# Patient Record
Sex: FEMALE | Race: WHITE | NOT HISPANIC OR LATINO | Employment: FULL TIME | URBAN - NONMETROPOLITAN AREA
[De-identification: names, ages, dates, MRNs, and addresses within clinical notes are randomized per-mention and may not be internally consistent; named-entity substitution may affect disease eponyms.]

---

## 2017-09-19 ENCOUNTER — OFFICE VISIT (OUTPATIENT)
Dept: URGENT CARE | Facility: PHYSICIAN GROUP | Age: 32
End: 2017-09-19
Payer: MEDICAID

## 2017-09-19 VITALS
SYSTOLIC BLOOD PRESSURE: 110 MMHG | DIASTOLIC BLOOD PRESSURE: 80 MMHG | RESPIRATION RATE: 16 BRPM | TEMPERATURE: 98.6 F | HEART RATE: 108 BPM | BODY MASS INDEX: 33.37 KG/M2 | OXYGEN SATURATION: 98 % | HEIGHT: 68 IN | WEIGHT: 220.2 LBS

## 2017-09-19 DIAGNOSIS — J31.0 RHINITIS, UNSPECIFIED TYPE: ICD-10-CM

## 2017-09-19 DIAGNOSIS — H65.93 FLUID LEVEL BEHIND TYMPANIC MEMBRANE OF BOTH EARS: ICD-10-CM

## 2017-09-19 PROCEDURE — 99214 OFFICE O/P EST MOD 30 MIN: CPT | Performed by: PHYSICIAN ASSISTANT

## 2017-09-19 RX ORDER — DEXAMETHASONE 4 MG/1
10 TABLET ORAL ONCE
Qty: 3 TAB | Refills: 0 | Status: SHIPPED | OUTPATIENT
Start: 2017-09-19 | End: 2017-09-19

## 2017-09-19 NOTE — PROGRESS NOTES
Chief Complaint   Patient presents with   • Sinus Problem       HISTORY OF PRESENT ILLNESS: Patient is a 32 y.o. female who presents today for the following:    Patient comes in for evaluation of nasal congestion and nasal burning that started last night. She complains of a small amount of clear nasal drainage. She denies ear pain, sore throat, cough, fever, night sweats, body aches. She has not taken any over-the-counter medication, stating that she has bad reactions to most over-the-counter medication. She denies any difficulty breathing. She is unable to use any nose sprays or nasal rinses.    There are no active problems to display for this patient.      Allergies:Amoxicillin    Current Outpatient Prescriptions Ordered in [facility]   Medication Sig Dispense Refill   • BuPROPion HCl (WELLBUTRIN PO) Take  by mouth.     • dexamethasone (DECADRON) 4 MG Tab Take 2.5 Tabs by mouth Once for 1 dose. 3 Tab 0   • NITROFURANTOIN MACROCRYSTAL PO Take  by mouth.     • Buprenorphine HCl 150 MCG FILM Place  in mouth by cheek.     • amoxicillin-clavulanate (AUGMENTIN) 875-125 MG Tab Take 1 Tab by mouth 2 times a day. 20 Tab 0     No current Epic-ordered facility-administered medications on file.        No past medical history on file.    Social History   Substance Use Topics   • Smoking status: Current Every Day Smoker     Packs/day: 0.50     Types: Cigarettes   • Smokeless tobacco: Never Used   • Alcohol use No       No family status information on file.   No family history on file.    ROS:   Review of Systems   Constitutional: Negative for fever, chills, weight loss and malaise/fatigue.   HENT: Negative for ear pain, nosebleeds, sore throat and neck pain.    Eyes: Negative for blurred vision.   Respiratory: Negative for cough, sputum production, shortness of breath and wheezing.    Cardiovascular: Negative for chest pain, palpitations, orthopnea and leg swelling.   Gastrointestinal: Negative for heartburn, nausea, vomiting and  "abdominal pain.   Genitourinary: Negative for dysuria, urgency and frequency.       Exam:  Blood pressure 110/80, pulse (!) 108, temperature 37 °C (98.6 °F), resp. rate 16, height 1.727 m (5' 8\"), weight 99.9 kg (220 lb 3.2 oz), SpO2 98 %.  General: Well developed, well nourished. No distress.  HEENT: Conjunctiva clear, lids without ptosis, PERRL/EOMI. Ears normal shape and contour, canals are clear bilaterally, tympanic membranes are benign But with clear fluid posteriorly bilaterally. Nasal mucosa Edematous bilaterally. Oropharynx is without erythema, edema or exudates. Reasonable dentition.  Pulmonary: Clear to ausculation and percussion.  Normal effort. No rales, ronchi, or wheezing.   Cardiovascular: Regular rate and rhythm without murmur. No edema.   Neurologic: Grossly nonfocal.  Lymph: No cervical lymphadenopathy noted.  Skin: Warm, dry, good turgor. No rashes in visible areas.   Psych: Normal mood. Alert and oriented x3. Judgment and insight is normal.    Assessment/Plan:  Discussed likely due to seasonal allergies or early viral illness. Discussed appropriate over-the-counter symptomatic medication, and when to return to clinic. Follow up for worsening or persistent symptoms.  1. Rhinitis, unspecified type  dexamethasone (DECADRON) 4 MG Tab   2. Fluid level behind tympanic membrane of both ears         "

## 2017-12-26 ENCOUNTER — OFFICE VISIT (OUTPATIENT)
Dept: URGENT CARE | Facility: PHYSICIAN GROUP | Age: 32
End: 2017-12-26
Payer: MEDICAID

## 2017-12-26 VITALS
SYSTOLIC BLOOD PRESSURE: 110 MMHG | HEART RATE: 69 BPM | WEIGHT: 213.3 LBS | TEMPERATURE: 98.2 F | OXYGEN SATURATION: 97 % | HEIGHT: 68 IN | RESPIRATION RATE: 16 BRPM | DIASTOLIC BLOOD PRESSURE: 80 MMHG | BODY MASS INDEX: 32.33 KG/M2

## 2017-12-26 DIAGNOSIS — H10.9 CONJUNCTIVITIS OF BOTH EYES, UNSPECIFIED CONJUNCTIVITIS TYPE: ICD-10-CM

## 2017-12-26 PROCEDURE — 99213 OFFICE O/P EST LOW 20 MIN: CPT | Performed by: FAMILY MEDICINE

## 2017-12-26 RX ORDER — POLYMYXIN B SULFATE AND TRIMETHOPRIM 1; 10000 MG/ML; [USP'U]/ML
1 SOLUTION OPHTHALMIC EVERY 4 HOURS
Qty: 1 BOTTLE | Refills: 0 | Status: SHIPPED | OUTPATIENT
Start: 2017-12-26 | End: 2018-03-07

## 2017-12-26 ASSESSMENT — ENCOUNTER SYMPTOMS
MYALGIAS: 0
SINUS PAIN: 1
HEADACHES: 1
PHOTOPHOBIA: 0
DOUBLE VISION: 0
BLURRED VISION: 0
WEIGHT LOSS: 0

## 2017-12-26 NOTE — PROGRESS NOTES
"Subjective:      Hoda Trevino is a 32 y.o. female who presents with Conjunctivitis; Runny Nose; and Pharyngitis            4 days left eye red and draining. 1 day right eye red and draining. Intermittent subjective fever. +nasal congestion. +ST. Hoarse voice. Intermittent cough. OTC dayquil with minimal improvement. No other aggravating or alleviating factors.        No contact lens use    Review of Systems   Constitutional: Negative for malaise/fatigue and weight loss.   HENT: Positive for sinus pain.    Eyes: Negative for blurred vision, double vision and photophobia.   Musculoskeletal: Negative for myalgias.   Skin: Negative for itching and rash.   Neurological: Positive for headaches.     .  Medications, Allergies, and current problem list reviewed today in Epic       Objective:     /80   Pulse 69   Temp 36.8 °C (98.2 °F)   Resp 16   Ht 1.727 m (5' 8\")   Wt 96.8 kg (213 lb 4.8 oz)   SpO2 97%   BMI 32.43 kg/m²      Physical Exam   Constitutional: She appears well-developed and well-nourished. No distress.   HENT:   Head: Normocephalic and atraumatic.   Nasal congestion  Pharynx red without exudate     Eyes: EOM are normal. Pupils are equal, round, and reactive to light.   B conjunctiva injected with moderate exudate. No foreign body. No gross corneal lesion.     Neck: Neck supple.   Cardiovascular: Normal rate, regular rhythm and normal heart sounds.    Pulmonary/Chest: Effort normal and breath sounds normal.   Lymphadenopathy:     She has no cervical adenopathy.   Neurological:   Speech is clear. Patient is appropriate and cooperative.     Skin: Skin is warm and dry. No rash noted.               Assessment/Plan:     1. Conjunctivitis of both eyes, unspecified conjunctivitis type  polymixin-trimethoprim (POLYTRIM) 25269-6.1 UNIT/ML-% Solution     Differential diagnosis, natural history, supportive care, and indications for immediate follow-up discussed at length.     "

## 2018-03-07 ENCOUNTER — OFFICE VISIT (OUTPATIENT)
Dept: MEDICAL GROUP | Facility: CLINIC | Age: 33
End: 2018-03-07
Payer: MEDICAID

## 2018-03-07 VITALS
SYSTOLIC BLOOD PRESSURE: 122 MMHG | WEIGHT: 217.5 LBS | DIASTOLIC BLOOD PRESSURE: 80 MMHG | RESPIRATION RATE: 16 BRPM | HEIGHT: 69 IN | OXYGEN SATURATION: 99 % | TEMPERATURE: 98.3 F | HEART RATE: 78 BPM | BODY MASS INDEX: 32.22 KG/M2

## 2018-03-07 DIAGNOSIS — Z00.00 ENCOUNTER FOR MEDICAL EXAMINATION TO ESTABLISH CARE: ICD-10-CM

## 2018-03-07 DIAGNOSIS — F31.81 BIPOLAR 2 DISORDER (HCC): ICD-10-CM

## 2018-03-07 DIAGNOSIS — Z87.42 HISTORY OF ABNORMAL CERVICAL PAPANICOLAOU SMEAR: ICD-10-CM

## 2018-03-07 DIAGNOSIS — E66.9 OBESITY (BMI 30-39.9): ICD-10-CM

## 2018-03-07 DIAGNOSIS — Z23 NEED FOR VACCINATION: ICD-10-CM

## 2018-03-07 PROCEDURE — 90715 TDAP VACCINE 7 YRS/> IM: CPT | Performed by: PHYSICIAN ASSISTANT

## 2018-03-07 PROCEDURE — 99214 OFFICE O/P EST MOD 30 MIN: CPT | Mod: 25 | Performed by: PHYSICIAN ASSISTANT

## 2018-03-07 PROCEDURE — 90471 IMMUNIZATION ADMIN: CPT | Performed by: PHYSICIAN ASSISTANT

## 2018-03-07 RX ORDER — BUPROPION HYDROCHLORIDE 150 MG/1
300 TABLET ORAL EVERY MORNING
COMMUNITY
End: 2018-04-26

## 2018-03-07 ASSESSMENT — PATIENT HEALTH QUESTIONNAIRE - PHQ9
5. POOR APPETITE OR OVEREATING: 0 - NOT AT ALL
CLINICAL INTERPRETATION OF PHQ2 SCORE: 1
SUM OF ALL RESPONSES TO PHQ QUESTIONS 1-9: 4

## 2018-03-07 NOTE — ASSESSMENT & PLAN NOTE
This patient continues to gain weight and is not specifically watching her calorie intake at this time she does not exercise.

## 2018-03-07 NOTE — ASSESSMENT & PLAN NOTE
"Patient has taken medication for this in the past. It is a chronic problem. The patient states that when \"switches to the dark side\" she becomes very sad, angry and withdrawn. During these times, she does not wish to interact with her infant son or her romantic partner. She states she becomes inconsolable during these times although she states that marijuana tends to calm her down, if she is able to obtain this. She states that during her \"light side\" episodes she is very productive, sewing and taking excellent care of herself and her children and partner. She states she barely becomes manic to the degree that she is out of control.  "

## 2018-03-07 NOTE — PROGRESS NOTES
"Chief Complaint   Patient presents with   • Manic Behavior       HISTORY OF PRESENT ILLNESS: Patient is a 32 y.o. female established patient who presents today for evaluation and management of:    Obesity (BMI 30-39.9)  This patient continues to gain weight and is not specifically watching her calorie intake at this time she does not exercise.     History of abnormal cervical Papanicolaou smear  Patient states she has had a LEEP procedure and positive HPV in the past. She states she leaves she is due for repeat Pap smear as it has been about one year at this point in time.    Bipolar 2 disorder (CMS-HCC)  Patient has taken medication for this in the past. It is a chronic problem. The patient states that when \"switches to the dark side\" she becomes very sad, angry and withdrawn. During these times, she does not wish to interact with her infant son or her romantic partner. She states she becomes inconsolable during these times although she states that marijuana tends to calm her down, if she is able to obtain this. She states that during her \"light side\" episodes she is very productive, sewing and taking excellent care of herself and her children and partner. She states she barely becomes manic to the degree that she is out of control.    Encounter for medical examination to establish care  Patient does not currently have a primary care provider but is seeking to establish care with one today.       Patient Active Problem List    Diagnosis Date Noted   • Obesity (BMI 30-39.9) 03/07/2018   • Bipolar 2 disorder (CMS-HCC) 03/07/2018   • History of abnormal cervical Papanicolaou smear 03/07/2018   • Encounter for medical examination to establish care 03/07/2018       Allergies:Amoxicillin    Current Outpatient Prescriptions   Medication Sig Dispense Refill   • buPROPion (WELLBUTRIN XL) 150 MG XL tablet Take 300 mg by mouth every morning.     • sertraline (ZOLOFT) 50 MG Tab 0.5 tab by mouth once daily for 7 days then, 1.0 " "tablet by mouth once daily thereafter 30 Tab 2     No current facility-administered medications for this visit.        Social History   Substance Use Topics   • Smoking status: Current Every Day Smoker     Packs/day: 0.75     Years: 20.00     Types: Cigarettes   • Smokeless tobacco: Never Used      Comment: 0.75ppd    • Alcohol use No       Family Status   Relation Status   • Mother Alive   • Father    • Sister Alive   • Brother Alive   • Child Alive     Family History   Problem Relation Age of Onset   • Kidney Disease Mother    • Psychiatry Mother    • Heart Disease Father    • Kidney Disease Father    • Diabetes Father    • Heart Disease Sister    • Depression Sister    • GI Sister    • No Known Problems Child        Review of Systems:   Constitutional: Negative for fever, chills, weight loss and malaise/fatigue.   HENT: Negative for ear pain, nosebleeds, congestion, sore throat and neck pain.    Eyes: Negative for blurred vision.   Respiratory: Positive for daily smoker's cough. Negative for sputum production, shortness of breath and wheezing.    Cardiovascular: Negative for chest pain, palpitations, orthopnea and leg swelling.   Gastrointestinal: Negative for heartburn, nausea, vomiting and abdominal pain.   Genitourinary: Negative for dysuria, urgency and frequency.   Neurological: Negative for dizziness, tingling, tremors, sensory change, focal weakness and headaches.   Endo/Heme/Allergies: Does not bruise/bleed easily.   Psychiatric/Behavioral: See history of present illness above. Positive for depression. Negative for suicidal ideas and memory loss.  The patient is occasionally nervous/anxious and does not have insomnia.      Exam:  Blood pressure 122/80, pulse 78, temperature 36.8 °C (98.3 °F), resp. rate 16, height 1.753 m (5' 9\"), weight 98.7 kg (217 lb 8 oz), SpO2 99 %.  Body mass index is 32.12 kg/m².  General:  Obese female in NAD  Head: is grossly normal. Poor dentition with multiple dental " caries.   Neck: Supple without masses. Thyroid is not visibly enlarged.  Pulmonary: Clear to ausculation. Normal effort. No rales, ronchi, or wheezing.  Cardiovascular: Regular rate and rhythm without murmur. Carotid and radial pulses are intact and equal bilaterally.  Extremities: no clubbing, cyanosis, or edema.  Behavioral: Patient seems to have normal insight and judgment at this time.    Medical decision-making and discussion:  1. Need for vaccination  - Tdap =>8yo IM    2. Obesity (BMI 30-39.9)  - Patient identified as having weight management issue.  Appropriate orders and counseling given.    3. Bipolar 2 disorder (CMS-[facility])    - sertraline (ZOLOFT) 50 MG Tab; 0.5 tab by mouth once daily for 7 days then, 1.0 tablet by mouth once daily thereafter  Dispense: 30 Tab; Refill: 2  - CBC WITH DIFFERENTIAL    4. History of abnormal cervical Papanicolaou smear  PAP scheduled.     5. Encounter for medical examination to establish care  I am happy to follow the care of this pleasant 32 year old woman.       Please note that this dictation was created using voice recognition software. I have made every reasonable attempt to correct obvious errors, but I expect that there are errors of grammar and possibly content that I did not discover before finalizing the note.      Return in about 4 weeks (around 4/4/2018) for bipolar follow up and seperate visit for Female annual.

## 2018-03-07 NOTE — ASSESSMENT & PLAN NOTE
Patient states she has had a LEEP procedure and positive HPV in the past. She states she leaves she is due for repeat Pap smear as it has been about one year at this point in time.

## 2018-03-07 NOTE — ASSESSMENT & PLAN NOTE
Patient does not currently have a primary care provider but is seeking to establish care with one today.

## 2018-03-22 ENCOUNTER — OFFICE VISIT (OUTPATIENT)
Dept: MEDICAL GROUP | Facility: CLINIC | Age: 33
End: 2018-03-22
Payer: MEDICAID

## 2018-03-22 ENCOUNTER — HOSPITAL ENCOUNTER (OUTPATIENT)
Facility: MEDICAL CENTER | Age: 33
End: 2018-03-22
Attending: PHYSICIAN ASSISTANT
Payer: MEDICAID

## 2018-03-22 VITALS
SYSTOLIC BLOOD PRESSURE: 122 MMHG | HEART RATE: 74 BPM | HEIGHT: 69 IN | OXYGEN SATURATION: 95 % | BODY MASS INDEX: 31.7 KG/M2 | WEIGHT: 214 LBS | TEMPERATURE: 98 F | RESPIRATION RATE: 16 BRPM | DIASTOLIC BLOOD PRESSURE: 60 MMHG

## 2018-03-22 DIAGNOSIS — Z01.419 WELL FEMALE EXAM WITH ROUTINE GYNECOLOGICAL EXAM: ICD-10-CM

## 2018-03-22 DIAGNOSIS — Z12.4 SCREENING FOR MALIGNANT NEOPLASM OF CERVIX: ICD-10-CM

## 2018-03-22 PROCEDURE — 88175 CYTOPATH C/V AUTO FLUID REDO: CPT

## 2018-03-22 PROCEDURE — 99395 PREV VISIT EST AGE 18-39: CPT | Mod: EP | Performed by: PHYSICIAN ASSISTANT

## 2018-03-22 PROCEDURE — 87624 HPV HI-RISK TYP POOLED RSLT: CPT

## 2018-03-22 PROCEDURE — 99000 SPECIMEN HANDLING OFFICE-LAB: CPT | Performed by: PHYSICIAN ASSISTANT

## 2018-03-22 NOTE — PROGRESS NOTES
SUBJECTIVE: 33 y.o. female for routine pap and checkup.  Chief Complaint   Patient presents with   • Gynecologic Exam         Last Pap: 2017  Contraception: Mirena IUD  H/O Abnormal Pap yes had LEEP procedure  H/O STI no  Current partner: male, monogamous yes    LMP: Patient's last menstrual period was 2017.    Allergies: Amoxicillin     ROS:  Feeling of menses every 3 months without cramping and with rare spotting and with labile mood.   No menstrual depression, anxiety, bloating/fluid retention, insomnia, migraine headaches, libido changes   no menopause symptoms of hot flashes, night sweats, sleep disruption, mood changes, vaginal dryness.   No pelvic pain, or dyspareunia. No vaginal discharge   No breast tenderness, mass, nipple discharge, changes in size or contour, or abnormal cyclic discomfort.  No urinary tract symptoms, no incontinence.   No abdominal pain, change in bowel habits, black or bloody stools.    No visual changes. Headaches 5-10 per month depending on stress levels and weather.   No prolonged cough. No dyspnea or chest pain on exertion.    No skin lesions, concerns.     Preventive Care:  Mammogram none    DEXA not due  Colonoscopy not due   HPV vaccine not eligible and never received.     Current Outpatient Prescriptions   Medication Sig Dispense Refill   • buPROPion (WELLBUTRIN XL) 150 MG XL tablet Take 300 mg by mouth every morning.     • sertraline (ZOLOFT) 50 MG Tab 0.5 tab by mouth once daily for 7 days then, 1.0 tablet by mouth once daily thereafter 30 Tab 2     No current facility-administered medications for this visit.      She  has no past medical history of Asthma or Diabetes (CMS-[facility]).  She  has no past surgical history on file.     Family History:   Family History   Problem Relation Age of Onset   • Kidney Disease Mother    • Psychiatry Mother    • Heart Disease Father    • Kidney Disease Father    • Diabetes Father    • Heart Disease Sister    • Depression  "Sister    • GI Sister    • No Known Problems Child    • Dementia Maternal Grandmother    • Breast Cancer Paternal Grandmother    • Lung Cancer Paternal Grandfather      possible asbestos       Family History negative for : Colon, Lung, or female organ cancer, thyroid disease, osteoporosis.     OBJECTIVE:   /60   Pulse 74   Temp 36.7 °C (98 °F)   Resp 16   Ht 1.753 m (5' 9\")   Wt 97.1 kg (214 lb)   LMP 02/22/2017   SpO2 95%   Breastfeeding? No   BMI 31.60 kg/m²   Body mass index is 31.6 kg/m².      HEAD AND NECK:  Ears normal.  Throat, oral cavity and tongue normal.  Neck supple. No adenopathy or masses in the neck or supraclavicular regions.  No carotid bruits. No thyromegaly.   NEURO: Cranial nerves are normal. DTR's normal and symmetric.    CHEST:  Clear, good air entry, no wheezes or rales.   HEART:  Regular rate and rhythm.  S1 and S2 normal. No edema or JVD.   ABDOMEN:  Soft without tenderness, guarding, mass or organomegaly.  No CVA tenderness or inguinal adenopathy.   EXTREMITIES:  Extremities, reflexes and peripheral pulses are normal.    SKIN:  No rashes or suspicious skin lesions noted.     Breast Exam: Performed with instruction during examination. No axillary lymphadenopathy. No fixed or dominant masses. No nipple retraction or skin abnormalities.    PELVIC EXAMINATION    Chaperone Marta Hooks MA    Labia: normal, no lesions or erythema noted   Urethral Orifice: normal  Vagina: vaginal canal clear, no lesions  Cervix: IUD strings noted at about 1.5cm long No polyps, masses or lesions noted. Thin yellow discharge noted.     BIMANUAL EXAM   Vaginal Walls: normal  Cervix: No CMT  Uterus: normal to palpation without masses or tenderness  Adnexa: wnl to palpation without masses or tenderness.   Kegel muscles weak to squeeze on bimanual exam      <ASSESSMENT>  1. Well female exam with routine gynecological exam  THINPREP PAP WITH HPV   2. Screening for malignant neoplasm of cervix  THINPREP " PAP WITH HPV       Discussed breast self exam, mammography screening, STD prevention, use and side effects of HRT, family planning choices and adequate intake of calcium and vitamin D   Follow-up in 3 years for next Gyn exam and Pap if all tests collected today are normal.

## 2018-03-23 DIAGNOSIS — Z01.419 WELL FEMALE EXAM WITH ROUTINE GYNECOLOGICAL EXAM: ICD-10-CM

## 2018-03-23 DIAGNOSIS — Z12.4 SCREENING FOR MALIGNANT NEOPLASM OF CERVIX: ICD-10-CM

## 2018-03-26 LAB
CYTOLOGY REG CYTOL: NORMAL
HPV HR 12 DNA CVX QL NAA+PROBE: NEGATIVE
HPV16 DNA SPEC QL NAA+PROBE: NEGATIVE
HPV18 DNA SPEC QL NAA+PROBE: NEGATIVE
SPECIMEN SOURCE: NORMAL

## 2018-04-26 ENCOUNTER — OFFICE VISIT (OUTPATIENT)
Dept: MEDICAL GROUP | Facility: CLINIC | Age: 33
End: 2018-04-26
Payer: MEDICAID

## 2018-04-26 VITALS
SYSTOLIC BLOOD PRESSURE: 128 MMHG | RESPIRATION RATE: 16 BRPM | OXYGEN SATURATION: 96 % | BODY MASS INDEX: 31.25 KG/M2 | HEIGHT: 69 IN | TEMPERATURE: 99.2 F | HEART RATE: 80 BPM | DIASTOLIC BLOOD PRESSURE: 76 MMHG | WEIGHT: 211 LBS

## 2018-04-26 DIAGNOSIS — F17.200 READY TO QUIT SMOKING: ICD-10-CM

## 2018-04-26 DIAGNOSIS — S29.012A MUSCLE STRAIN OF RIGHT UPPER BACK, INITIAL ENCOUNTER: ICD-10-CM

## 2018-04-26 DIAGNOSIS — F31.81 BIPOLAR 2 DISORDER (HCC): ICD-10-CM

## 2018-04-26 PROBLEM — Z00.00 ENCOUNTER FOR MEDICAL EXAMINATION TO ESTABLISH CARE: Status: RESOLVED | Noted: 2018-03-07 | Resolved: 2018-04-26

## 2018-04-26 PROCEDURE — 99406 BEHAV CHNG SMOKING 3-10 MIN: CPT | Performed by: PHYSICIAN ASSISTANT

## 2018-04-26 PROCEDURE — 99213 OFFICE O/P EST LOW 20 MIN: CPT | Mod: 25 | Performed by: PHYSICIAN ASSISTANT

## 2018-04-26 RX ORDER — BUPROPION HYDROCHLORIDE 100 MG/1
100 TABLET ORAL 2 TIMES DAILY
Qty: 60 TAB | Refills: 5 | Status: SHIPPED | OUTPATIENT
Start: 2018-04-26 | End: 2019-11-15

## 2018-04-26 ASSESSMENT — PAIN SCALES - GENERAL: PAINLEVEL: 3=SLIGHT PAIN

## 2018-04-26 NOTE — ASSESSMENT & PLAN NOTE
This patient states that about one month ago, she experienced right shoulder and upper back pain that has not resolved. She states that occasionally she also experiences right-sided neck pain associated with her right shoulder and upper back pain. She has taken ibuprofen however cannot recall if this alleviated her pain as she typically uses marijuana for pain relief. She states she is unable to sleep on her right side due to pain and her pain is made worse with roughhousing with her young son.

## 2018-04-26 NOTE — PROGRESS NOTES
"Chief Complaint   Patient presents with   • Shoulder Pain     Poss. injury playing with child        HISTORY OF PRESENT ILLNESS: Patient is a 33 y.o. female established patient who presents today for evaluation and management of:    Ready to quit smoking  This patient has been smoking for many years. She continues to smoke 3/4-1 pack per day but truly wishes to quit. She is requesting Wellbutrin at this time as she has previously successfully quit smoking while using Wellbutrin.    Muscle strain of right upper back  This patient states that about one month ago, she experienced right shoulder and upper back pain that has not resolved. She states that occasionally she also experiences right-sided neck pain associated with her right shoulder and upper back pain. She has taken ibuprofen however cannot recall if this alleviated her pain as she typically uses marijuana for pain relief. She states she is unable to sleep on her right side due to pain and her pain is made worse with roughhousing with her young son.    Bipolar 2 disorder (CMS-[facility])  Patient has taken medication for this in the past. It is a chronic problem. The patient states that when \"switches to the dark side\" she becomes very sad, angry and withdrawn. During these times, she does not wish to interact with her infant son or her romantic partner. She states she becomes inconsolable during these times although she states that marijuana tends to calm her down, if she is able to obtain this. She states that during her \"light side\" episodes she is very productive, sewing and taking excellent care of herself and her children and partner. She states she barely becomes manic to the degree that she is out of control. She has tried taking Zoloft for a couple of months however, she states she felt too sleepy on this medication and has recently been controlling her symptoms without medication management. She wishes to remain off medication for this at this time and " states she will return for follow-up as needed for medication management.       Patient Active Problem List    Diagnosis Date Noted   • Muscle strain of right upper back 2018   • Ready to quit smoking 2018   • Obesity (BMI 30-39.9) 2018   • Bipolar 2 disorder (CMS-[facility]) 2018   • History of abnormal cervical Papanicolaou smear 2018       Allergies:Amoxicillin    Current Outpatient Prescriptions   Medication Sig Dispense Refill   • Diclofenac Sodium 1 % Gel Apply 1 g to skin as directed 3 times a day as needed. 15 g 2   • buPROPion (WELLBUTRIN) 100 MG Tab Take 1 Tab by mouth 2 times a day. 60 Tab 5     No current facility-administered medications for this visit.        Social History   Substance Use Topics   • Smoking status: Current Every Day Smoker     Packs/day: 0.75     Years: 20.00     Types: Cigarettes   • Smokeless tobacco: Never Used      Comment: 0.75ppd    • Alcohol use No       Family Status   Relation Status   • Mother Alive   • Father    • Sister Alive   • Brother Alive   • Child Alive   • Maternal Grandmother    • Maternal Grandfather    • Paternal Grandmother    • Paternal Grandfather      Family History   Problem Relation Age of Onset   • Kidney Disease Mother    • Psychiatry Mother    • Heart Disease Father    • Kidney Disease Father    • Diabetes Father    • Heart Disease Sister    • Depression Sister    • GI Sister    • No Known Problems Child    • Dementia Maternal Grandmother    • Breast Cancer Paternal Grandmother    • Lung Cancer Paternal Grandfather      possible asbestos       Review of Systems:   Constitutional: Negative for fever, chills, weight loss and malaise/fatigue.   HENT: Negative for ear pain, nosebleeds, congestion, sore throat  Eyes: Negative for blurred vision.   Respiratory: Negative for cough and shortness of breath at rest  Cardiovascular: Negative for chest pain  Musculoskeletal: See history of present  "illness above. Negative for myalgias.  Skin: Negative for rash and itching.   Neurological: Negative for dizziness and headaches. Negative for loss of sensation in the right arm.  Endo/Heme/Allergies: Does not bruise/bleed easily.   Psychiatric/Behavioral: See history of present illness above. Negative for suicidal ideas and memory loss.  The patient is not nervous/anxious and does not have insomnia.      Exam:  Blood pressure 128/76, pulse 80, temperature 37.3 °C (99.2 °F), resp. rate 16, height 1.753 m (5' 9\"), weight 95.7 kg (211 lb), last menstrual period 04/03/2017, SpO2 96 %, not currently breastfeeding.  Body mass index is 31.16 kg/m².  General:  Overweight female in NAD  Head: is grossly normal.  Neck: Supple without masses. Thyroid is not visibly enlarged.  Pulmonary: Normal effort.  Cardiovascular:Carotid and radial pulses are intact and equal bilaterally.  Extremities: no clubbing, cyanosis, or edema.  Musculoskeletal: Speed sign positive, empty can test positive positive with pain to abduction of right shoulder. Negative for pain to all passive range of motion of right arm and shoulder. Pain to palpation near the deltoid tuberosity of the right humerus. No erythema or edema noted.    Medical decision-making and discussion:  1. Muscle strain of right upper back, initial encounter  - REFERRAL TO PHYSICAL THERAPY Reason for Therapy: Eval/Treat/Report  - Diclofenac Sodium 1 % Gel; Apply 1 g to skin as directed 3 times a day as needed.  Dispense: 15 g; Refill: 2    2. Ready to quit smoking  - buPROPion (WELLBUTRIN) 100 MG Tab; Take 1 Tab by mouth 2 times a day.  Dispense: 60 Tab; Refill: 5    3. Bipolar 2 disorder (CMS-[facility])  Patient was strongly advised to seek care if she develops symptoms of bipolar disorder again in the future.      Please note that this dictation was created using voice recognition software. I have made every reasonable attempt to correct obvious errors, but I expect that there are errors " of grammar and possibly content that I did not discover before finalizing the note.      Return in about 4 weeks (around 5/24/2018) for smoking cessation.

## 2018-04-26 NOTE — ASSESSMENT & PLAN NOTE
"Patient has taken medication for this in the past. It is a chronic problem. The patient states that when \"switches to the dark side\" she becomes very sad, angry and withdrawn. During these times, she does not wish to interact with her infant son or her romantic partner. She states she becomes inconsolable during these times although she states that marijuana tends to calm her down, if she is able to obtain this. She states that during her \"light side\" episodes she is very productive, sewing and taking excellent care of herself and her children and partner. She states she barely becomes manic to the degree that she is out of control. She has tried taking Zoloft for a couple of months however, she states she felt too sleepy on this medication and has recently been controlling her symptoms without medication management. She wishes to remain off medication for this at this time and states she will return for follow-up as needed for medication management.  "

## 2018-04-26 NOTE — ASSESSMENT & PLAN NOTE
This patient has been smoking for many years. She continues to smoke 3/4-1 pack per day but truly wishes to quit. She is requesting Wellbutrin at this time as she has previously successfully quit smoking while using Wellbutrin.

## 2018-05-30 ENCOUNTER — OFFICE VISIT (OUTPATIENT)
Dept: MEDICAL GROUP | Facility: CLINIC | Age: 33
End: 2018-05-30
Payer: MEDICAID

## 2018-05-30 VITALS
OXYGEN SATURATION: 97 % | RESPIRATION RATE: 14 BRPM | TEMPERATURE: 98 F | HEIGHT: 69 IN | DIASTOLIC BLOOD PRESSURE: 72 MMHG | SYSTOLIC BLOOD PRESSURE: 126 MMHG | WEIGHT: 213 LBS | HEART RATE: 82 BPM | BODY MASS INDEX: 31.55 KG/M2

## 2018-05-30 DIAGNOSIS — F31.81 BIPOLAR 2 DISORDER (HCC): ICD-10-CM

## 2018-05-30 DIAGNOSIS — S29.012D MUSCLE STRAIN OF RIGHT UPPER BACK, SUBSEQUENT ENCOUNTER: ICD-10-CM

## 2018-05-30 DIAGNOSIS — F17.200 READY TO QUIT SMOKING: ICD-10-CM

## 2018-05-30 PROCEDURE — 99213 OFFICE O/P EST LOW 20 MIN: CPT | Performed by: PHYSICIAN ASSISTANT

## 2018-05-30 NOTE — ASSESSMENT & PLAN NOTE
This patient states that about 2 months ago, she experienced right shoulder and upper back pain that has now somewhat resolved with physical therapy. She states that occasionally she also experiences right-sided neck pain associated with her right shoulder and upper back pain. She has taken ibuprofen with moderate pain relief and she typically uses marijuana for pain relief. She states she is unable to sleep on her right side due to pain and her pain is made worse with roughhousing with her young son. She did not try using the diclofenac gel that had been prescribed because her physical therapist advised against this. She feels she does not need any further medical management of this problem at this time.

## 2018-05-30 NOTE — PROGRESS NOTES
Chief Complaint   Patient presents with   • Shoulder Pain     x 2 months     HISTORY OF PRESENT ILLNESS: Patient is a 33 y.o. female established patient who presents today for evaluation and management of:    Bipolar 2 disorder (CMS-HCC) (HCC)  This patient states that smoking marijuana when she has triggers that result in angry outbursts really helps prevent her from becoming aggressive. She states she is feeling able to go out with her  more and be more social recently. She is requesting a behavioral health referral in order to meet with a counselor. She tried taking Zoloft for a couple of months with adverse side effects and so stopped this medication.    Muscle strain of right upper back  This patient states that about 2 months ago, she experienced right shoulder and upper back pain that has now somewhat resolved with physical therapy. She states that occasionally she also experiences right-sided neck pain associated with her right shoulder and upper back pain. She has taken ibuprofen with moderate pain relief and she typically uses marijuana for pain relief. She states she is unable to sleep on her right side due to pain and her pain is made worse with roughhousing with her young son. She did not try using the diclofenac gel that had been prescribed because her physical therapist advised against this. She feels she does not need any further medical management of this problem at this time.    Ready to quit smoking  This patient has been smoking for many years she recently quit smoking for approximately 6 days and then restarted and is currently smoking 5-6 cigarettes per day. She had been using Wellbutrin and has restarted her course in cutting down with the goal of quitting smoking.       Patient Active Problem List    Diagnosis Date Noted   • Muscle strain of right upper back 04/26/2018   • Ready to quit smoking 04/26/2018   • Obesity (BMI 30-39.9) 03/07/2018   • Bipolar 2 disorder (HCC) 03/07/2018   •  History of abnormal cervical Papanicolaou smear 2018       Allergies:Amoxicillin    Current Outpatient Prescriptions   Medication Sig Dispense Refill   • Diclofenac Sodium 1 % Gel Apply 1 g to skin as directed 3 times a day as needed. 15 g 2   • buPROPion (WELLBUTRIN) 100 MG Tab Take 1 Tab by mouth 2 times a day. 60 Tab 5     No current facility-administered medications for this visit.        Social History   Substance Use Topics   • Smoking status: Current Every Day Smoker     Packs/day: 0.75     Years: 20.00     Types: Cigarettes   • Smokeless tobacco: Never Used   • Alcohol use No       Family Status   Relation Status   • Mother Alive   • Father    • Sister Alive   • Brother Alive   • Child Alive   • Maternal Grandmother    • Maternal Grandfather    • Paternal Grandmother    • Paternal Grandfather      Family History   Problem Relation Age of Onset   • Kidney Disease Mother    • Psychiatry Mother    • Heart Disease Father    • Kidney Disease Father    • Diabetes Father    • Heart Disease Sister    • Depression Sister    • GI Sister    • No Known Problems Child    • Dementia Maternal Grandmother    • Breast Cancer Paternal Grandmother    • Lung Cancer Paternal Grandfather      possible asbestos       Review of Systems:   Constitutional: Negative for fever, chills, weight loss and malaise/fatigue.   HENT: Negative for ear pain, nosebleeds, congestion, sore throat  Respiratory: Negative for cough and shortness of breath  Cardiovascular: Negative for chest pain  Gastrointestinal: Negative for heartburn, nausea, vomiting and abdominal pain.   Musculoskeletal: Negative for myalgias, back pain. See history of present illness above.   Skin: Negative for rash and itching.   Endo/Heme/Allergies: Does not bruise/bleed easily.   Psychiatric/Behavioral: Positive for mood disorder/depression. Negative for suicidal ideas and memory loss.  The patient is not nervous/anxious and does  "not have insomnia.      Exam:  Blood pressure 126/72, pulse 82, temperature 36.7 °C (98 °F), resp. rate 14, height 1.753 m (5' 9\"), weight 96.6 kg (213 lb), SpO2 97 %.  Body mass index is 31.45 kg/m².  General:  Obese female in NAD  Head: is grossly normal.  Neck: Supple without masses. Thyroid is not visibly enlarged.  Pulmonary: Clear to ausculation. Normal effort. No rales, ronchi, or wheezing.  Cardiovascular: Regular rate and rhythm without murmur. Carotid and radial pulses are intact and equal bilaterally.  Extremities: no clubbing, cyanosis, or edema.    Medical decision-making and discussion:  1. Muscle strain of right upper back, subsequent encounter  Patient would like to continue with physical therapy at this time as she does not wish to take any medications that are prescribed to alleviate her pain.    2. Bipolar 2 disorder (HCC)  - REFERRAL TO BEHAVIORAL HEALTH    3. Ready to quit smoking  Patient was advised to continue trying to quit smoking as this will improve her health outcomes in the future.      Please note that this dictation was created using voice recognition software. I have made every reasonable attempt to correct obvious errors, but I expect that there are errors of grammar and possibly content that I did not discover before finalizing the note.      Return if symptoms worsen or fail to improve.  "

## 2018-05-30 NOTE — ASSESSMENT & PLAN NOTE
This patient states that smoking marijuana when she has triggers that result in angry outbursts really helps prevent her from becoming aggressive. She states she is feeling able to go out with her  more and be more social recently. She is requesting a behavioral health referral in order to meet with a counselor. She tried taking Zoloft for a couple of months with adverse side effects and so stopped this medication.

## 2018-05-30 NOTE — ASSESSMENT & PLAN NOTE
This patient has been smoking for many years she recently quit smoking for approximately 6 days and then restarted and is currently smoking 5-6 cigarettes per day. She had been using Wellbutrin and has restarted her course in cutting down with the goal of quitting smoking.

## 2018-08-23 ENCOUNTER — OFFICE VISIT (OUTPATIENT)
Dept: MEDICAL GROUP | Facility: CLINIC | Age: 33
End: 2018-08-23
Payer: MEDICAID

## 2018-08-23 VITALS
OXYGEN SATURATION: 95 % | RESPIRATION RATE: 12 BRPM | SYSTOLIC BLOOD PRESSURE: 122 MMHG | HEART RATE: 56 BPM | BODY MASS INDEX: 30.96 KG/M2 | WEIGHT: 209 LBS | HEIGHT: 69 IN | DIASTOLIC BLOOD PRESSURE: 80 MMHG | TEMPERATURE: 97.8 F

## 2018-08-23 DIAGNOSIS — E66.9 OBESITY (BMI 30-39.9): ICD-10-CM

## 2018-08-23 DIAGNOSIS — S29.012D MUSCLE STRAIN OF RIGHT UPPER BACK, SUBSEQUENT ENCOUNTER: ICD-10-CM

## 2018-08-23 DIAGNOSIS — N12 PYELONEPHRITIS: ICD-10-CM

## 2018-08-23 LAB
APPEARANCE UR: NORMAL
BILIRUB UR STRIP-MCNC: NEGATIVE MG/DL
COLOR UR AUTO: YELLOW
GLUCOSE UR STRIP.AUTO-MCNC: NEGATIVE MG/DL
KETONES UR STRIP.AUTO-MCNC: NEGATIVE MG/DL
LEUKOCYTE ESTERASE UR QL STRIP.AUTO: NORMAL
NITRITE UR QL STRIP.AUTO: POSITIVE
PH UR STRIP.AUTO: 7.5 [PH] (ref 5–8)
PROT UR QL STRIP: NEGATIVE MG/DL
RBC UR QL AUTO: NORMAL
SP GR UR STRIP.AUTO: 1.01
UROBILINOGEN UR STRIP-MCNC: 0.2 MG/DL

## 2018-08-23 PROCEDURE — 99213 OFFICE O/P EST LOW 20 MIN: CPT | Mod: 25 | Performed by: PHYSICIAN ASSISTANT

## 2018-08-23 PROCEDURE — 81002 URINALYSIS NONAUTO W/O SCOPE: CPT | Performed by: PHYSICIAN ASSISTANT

## 2018-08-23 RX ORDER — CIPROFLOXACIN 500 MG/1
500 TABLET, FILM COATED ORAL EVERY 12 HOURS
Qty: 14 TAB | Refills: 0 | Status: SHIPPED | OUTPATIENT
Start: 2018-08-23 | End: 2018-08-30

## 2018-08-23 NOTE — ASSESSMENT & PLAN NOTE
This patient has a history of recurrent pyelonephritis which she states feels like a pain across her low back. Her urinalysis confirms a current infection today.

## 2018-08-23 NOTE — PROGRESS NOTES
Chief Complaint   Patient presents with   • Back Pain     1 spot causing pain in arm/ neck x 1 week        HISTORY OF PRESENT ILLNESS: Patient is a 33 y.o. female established patient who presents today for evaluation and management of:    Muscle strain of right upper back  This patient states that about 5 months ago, she experienced right shoulder and upper back pain that has now almost completely resolved with physical therapy. She states that occasionally she also experiences left sided upper back pain. She has taken ibuprofen with moderate pain relief and she typically uses marijuana for pain relief. Her pain is made worse with roughhousing with her young son. She did try using the diclofenac gel that had been prescribed without good relief. She has a tens machine at home and obtained god relief from use of this while walking around [employer] the other day. She didn't think to use this to alleviate her current mid, upper back pain.     Pyelonephritis  This patient has a history of recurrent pyelonephritis which she states feels like a pain across her low back. Her urinalysis confirms a current infection today.        Patient Active Problem List    Diagnosis Date Noted   • Pyelonephritis 08/23/2018   • Muscle strain of right upper back 04/26/2018   • Ready to quit smoking 04/26/2018   • Obesity (BMI 30-39.9) 03/07/2018   • Bipolar 2 disorder (HCC) 03/07/2018   • History of abnormal cervical Papanicolaou smear 03/07/2018       Allergies:Amoxicillin    Current Outpatient Prescriptions   Medication Sig Dispense Refill   • ciprofloxacin (CIPRO) 500 MG Tab Take 1 Tab by mouth every 12 hours for 7 days. 14 Tab 0   • buPROPion (WELLBUTRIN) 100 MG Tab Take 1 Tab by mouth 2 times a day. 60 Tab 5     No current facility-administered medications for this visit.        Social History   Substance Use Topics   • Smoking status: Current Every Day Smoker     Packs/day: 0.50     Years: 20.00     Types: Cigarettes   • Smokeless  "tobacco: Never Used   • Alcohol use No       Family Status   Relation Status   • Mo Alive   • Fa    • Sis Alive   • Bro Alive   • Child Alive   • MGMo    • MGFa    • PGMo    • PGFa      Family History   Problem Relation Age of Onset   • Kidney Disease Mother    • Psychiatry Mother    • Heart Disease Father    • Kidney Disease Father    • Diabetes Father    • Heart Disease Sister    • Depression Sister    • GI Sister    • No Known Problems Child    • Dementia Maternal Grandmother    • Breast Cancer Paternal Grandmother    • Lung Cancer Paternal Grandfather         possible asbestos       Review of Systems: See HPI above.   Constitutional: Negative for fever, chills, weight loss and malaise/fatigue.   HENT: Negative for ear pain, nosebleeds, congestion, sore throat and neck pain.    Eyes: Negative for blurred vision.   Respiratory: Negative for cough, sputum production, shortness of breath and wheezing.    Cardiovascular: Negative for chest pain, palpitations, orthopnea and leg swelling.   Gastrointestinal: Negative for heartburn, nausea, vomiting and abdominal pain.   Genitourinary: Negative for malodorous urine, dysuria, urgency and frequency.   Musculoskeletal: Negative for myalgias and joint pain. See hpi above.   Skin: Negative for rash and itching.     Exam:  Blood pressure 122/80, pulse (!) 56, temperature 36.6 °C (97.8 °F), resp. rate 12, height 1.753 m (5' 9\"), weight 94.8 kg (209 lb), SpO2 95 %.  Body mass index is 30.86 kg/m².  General:  Obese female in NAD  Head: is grossly normal.  Neck: Supple without masses. Thyroid is not visibly enlarged.  Pulmonary:  Normal effort.   Cardiovascular: Regular rate and rhythm without murmur. Carotid and radial pulses are intact and equal bilaterally.  Extremities: no clubbing, cyanosis, or edema.  Negative for suprapubic pain to palpation.  Musculoskeletal: mid-back pain better when laying on stomach and alowing head to drop to " one side. Is only made worse by activating muscles such as with head motion, arm motion etc. Slightly tender to palpation of midline spine.     Medical decision-making and discussion:  1. Muscle strain of right upper back, subsequent encounter  Advised to use her tens unit for 20 minutes twice per day.     2. Pyelonephritis  Encouraged improvements in hygiene, reduction of soda consumption and taking a daily preventative cranberry supplement as well as increased water intake.   - POCT Urinalysis positive for leuks, nitrates and blood.   - ciprofloxacin (CIPRO) 500 MG Tab; Take 1 Tab by mouth every 12 hours for 7 days.  Dispense: 14 Tab; Refill: 0    3. Obesity (BMI 30-39.9)    - Patient identified as having weight management issue.  Appropriate orders and counseling given.      Please note that this dictation was created using voice recognition software. I have made every reasonable attempt to correct obvious errors, but I expect that there are errors of grammar and possibly content that I did not discover before finalizing the note.      Return if symptoms worsen or fail to improve.

## 2018-08-23 NOTE — ASSESSMENT & PLAN NOTE
This patient states that about 5 months ago, she experienced right shoulder and upper back pain that has now almost completely resolved with physical therapy. She states that occasionally she also experiences left sided upper back pain. She has taken ibuprofen with moderate pain relief and she typically uses marijuana for pain relief. Her pain is made worse with roughhousing with her young son. She did try using the diclofenac gel that had been prescribed without good relief. She has a tens machine at home and obtained god relief from use of this while walking around [employer] the other day. She didn't think to use this to alleviate her current mid, upper back pain.

## 2018-08-23 NOTE — PATIENT INSTRUCTIONS
Urinary Tract Infection, Adult  Introduction  A urinary tract infection (UTI) is an infection of any part of the urinary tract. The urinary tract includes the:  · Kidneys.  · Ureters.  · Bladder.  · Urethra.  These organs make, store, and get rid of pee (urine) in the body.  Follow these instructions at home:  · Take over-the-counter and prescription medicines only as told by your doctor.  · If you were prescribed an antibiotic medicine, take it as told by your doctor. Do not stop taking the antibiotic even if you start to feel better.  · Avoid the following drinks:  ¨ Alcohol.  ¨ Caffeine.  ¨ Tea.  ¨ Carbonated drinks.  · Drink enough fluid to keep your pee clear or pale yellow.  · Keep all follow-up visits as told by your doctor. This is important.  · Make sure to:  ¨ Empty your bladder often and completely. Do not to hold pee for long periods of time.  ¨ Empty your bladder before and after sex.  ¨ Wipe from front to back after a bowel movement if you are female. Use each tissue one time when you wipe.  Contact a doctor if:  · You have back pain.  · You have a fever.  · You feel sick to your stomach (nauseous).  · You throw up (vomit).  · Your symptoms do not get better after 3 days.  · Your symptoms go away and then come back.  Get help right away if:  · You have very bad back pain.  · You have very bad lower belly (abdominal) pain.  · You are throwing up and cannot keep down any medicines or water.  This information is not intended to replace advice given to you by your health care provider. Make sure you discuss any questions you have with your health care provider.  Document Released: 06/05/2009 Document Revised: 05/25/2017 Document Reviewed: 11/07/2016  © 2017 [employer]      Pyelonephritis, Adult  Introduction  Pyelonephritis is a kidney infection. The kidneys are organs that help clean your blood by moving waste out of your blood and into your pee (urine). This infection can happen quickly, or it can last  for a long time. In most cases, it clears up with treatment and does not cause other problems.  Follow these instructions at home:  Medicines  · Take over-the-counter and prescription medicines only as told by your doctor.  · Take your antibiotic medicine as told by your doctor. Do not stop taking the medicine even if you start to feel better.  General instructions  · Drink enough fluid to keep your pee clear or pale yellow.  · Avoid caffeine, tea, and carbonated drinks.  · Pee (urinate) often. Avoid holding in pee for long periods of time.  · Pee before and after sex.  · After pooping (having a bowel movement), women should wipe from front to back. Use each tissue only once.  · Keep all follow-up visits as told by your doctor. This is important.  Contact a doctor if:  · You do not feel better after 2 days.  · Your symptoms get worse.  · You have a fever.  Get help right away if:  · You cannot take your medicine or drink fluids as told.  · You have chills and shaking.  · You throw up (vomit).  · You have very bad pain in your side (flank) or back.  · You feel very weak or you pass out (faint).  This information is not intended to replace advice given to you by your health care provider. Make sure you discuss any questions you have with your health care provider.  Document Released: 01/25/2006 Document Revised: 05/25/2017 Document Reviewed: 04/11/2016  © 2017 Elsevier

## 2018-08-31 ENCOUNTER — HOSPITAL ENCOUNTER (EMERGENCY)
Facility: MEDICAL CENTER | Age: 33
End: 2018-08-31
Attending: EMERGENCY MEDICINE
Payer: MEDICAID

## 2018-08-31 ENCOUNTER — APPOINTMENT (OUTPATIENT)
Dept: RADIOLOGY | Facility: MEDICAL CENTER | Age: 33
End: 2018-08-31
Attending: EMERGENCY MEDICINE
Payer: MEDICAID

## 2018-08-31 VITALS
HEIGHT: 71 IN | TEMPERATURE: 98.2 F | WEIGHT: 210.54 LBS | DIASTOLIC BLOOD PRESSURE: 82 MMHG | HEART RATE: 55 BPM | OXYGEN SATURATION: 98 % | RESPIRATION RATE: 16 BRPM | BODY MASS INDEX: 29.48 KG/M2 | SYSTOLIC BLOOD PRESSURE: 123 MMHG

## 2018-08-31 DIAGNOSIS — S30.0XXA CONTUSION OF COCCYX, INITIAL ENCOUNTER: ICD-10-CM

## 2018-08-31 LAB
APPEARANCE UR: ABNORMAL
COLOR UR AUTO: YELLOW
GLUCOSE UR QL STRIP.AUTO: NEGATIVE MG/DL
KETONES UR QL STRIP.AUTO: NEGATIVE MG/DL
LEUKOCYTE ESTERASE UR QL STRIP.AUTO: NEGATIVE
NITRITE UR QL STRIP.AUTO: NEGATIVE
PH UR STRIP.AUTO: 6 [PH]
PROT UR QL STRIP: NEGATIVE MG/DL
RBC UR QL AUTO: ABNORMAL
SP GR UR: 1.02

## 2018-08-31 PROCEDURE — 72220 X-RAY EXAM SACRUM TAILBONE: CPT

## 2018-08-31 PROCEDURE — 700111 HCHG RX REV CODE 636 W/ 250 OVERRIDE (IP): Performed by: EMERGENCY MEDICINE

## 2018-08-31 PROCEDURE — 700102 HCHG RX REV CODE 250 W/ 637 OVERRIDE(OP): Performed by: EMERGENCY MEDICINE

## 2018-08-31 PROCEDURE — 96372 THER/PROPH/DIAG INJ SC/IM: CPT

## 2018-08-31 PROCEDURE — 700101 HCHG RX REV CODE 250: Performed by: EMERGENCY MEDICINE

## 2018-08-31 PROCEDURE — 99284 EMERGENCY DEPT VISIT MOD MDM: CPT

## 2018-08-31 PROCEDURE — 72100 X-RAY EXAM L-S SPINE 2/3 VWS: CPT

## 2018-08-31 PROCEDURE — 81002 URINALYSIS NONAUTO W/O SCOPE: CPT

## 2018-08-31 PROCEDURE — A9270 NON-COVERED ITEM OR SERVICE: HCPCS | Performed by: EMERGENCY MEDICINE

## 2018-08-31 RX ORDER — KETOROLAC TROMETHAMINE 10 MG/1
10 TABLET, FILM COATED ORAL EVERY 4 HOURS PRN
Qty: 30 TAB | Refills: 0 | Status: SHIPPED | OUTPATIENT
Start: 2018-08-31 | End: 2018-09-10

## 2018-08-31 RX ORDER — HYDROCODONE BITARTRATE AND ACETAMINOPHEN 5; 325 MG/1; MG/1
1 TABLET ORAL ONCE
Status: COMPLETED | OUTPATIENT
Start: 2018-08-31 | End: 2018-08-31

## 2018-08-31 RX ORDER — LIDOCAINE 50 MG/G
1 PATCH TOPICAL EVERY 24 HOURS
Qty: 10 PATCH | Refills: 0 | Status: SHIPPED | OUTPATIENT
Start: 2018-08-31 | End: 2018-09-05

## 2018-08-31 RX ORDER — KETOROLAC TROMETHAMINE 30 MG/ML
15 INJECTION, SOLUTION INTRAMUSCULAR; INTRAVENOUS ONCE
Status: COMPLETED | OUTPATIENT
Start: 2018-08-31 | End: 2018-08-31

## 2018-08-31 RX ORDER — LIDOCAINE 50 MG/G
1 PATCH TOPICAL ONCE
Status: DISCONTINUED | OUTPATIENT
Start: 2018-08-31 | End: 2018-09-01 | Stop reason: HOSPADM

## 2018-08-31 RX ADMIN — KETOROLAC TROMETHAMINE 15 MG: 30 INJECTION, SOLUTION INTRAMUSCULAR at 22:15

## 2018-08-31 RX ADMIN — LIDOCAINE 1 PATCH: 50 PATCH CUTANEOUS at 22:15

## 2018-08-31 RX ADMIN — HYDROCODONE BITARTRATE AND ACETAMINOPHEN 1 TABLET: 5; 325 TABLET ORAL at 22:15

## 2018-08-31 ASSESSMENT — ENCOUNTER SYMPTOMS
FLANK PAIN: 0
ABDOMINAL PAIN: 0

## 2018-08-31 ASSESSMENT — LIFESTYLE VARIABLES: DO YOU DRINK ALCOHOL: NO

## 2018-08-31 ASSESSMENT — PAIN SCALES - GENERAL: PAINLEVEL_OUTOF10: 8

## 2018-09-01 NOTE — DISCHARGE INSTRUCTIONS
Tailbone Injury  You do not have a fracture of her tailbone but is likely bruised.  For this we will send her home with pain medication and a pain patch.  If you develop worsening pain or pain when he urinate or fever return.  The tailbone (coccyx) is the small bone at the lower end of the spine. A tailbone injury may involve stretched ligaments, bruising, or a broken bone (fracture). Tailbone injuries can be painful, and some may take a long time to heal.  What are the causes?  This condition is often caused by falling and landing on the tailbone. Other causes include:  · Repeated strain or friction from actions such as rowing and bicycling.  · Childbirth.  In some cases, the cause may not be known.  What increases the risk?  This condition is more common in women than in men.  What are the signs or symptoms?  Symptoms of this condition include:  · Pain in the lower back, especially when sitting.  · Pain or difficulty when standing up from a sitting position.  · Bruising in the tailbone area.  · Painful bowel movements.  · In women, pain during intercourse.  How is this diagnosed?  This condition may be diagnosed based on your symptoms and a physical exam. X-rays may be taken if a fracture is suspected. You may also have other tests, such as a CT scan or MRI.  How is this treated?  This condition may be treated with medicines to help relieve your pain. Most tailbone injuries heal on their own in 4-6 weeks. However, recovery time may be longer if the injury involves a fracture.  Follow these instructions at home:  · Take medicines only as directed by your health care provider.  · If directed, apply ice to the injured area:  ¨ Put ice in a plastic bag.  ¨ Place a towel between your skin and the bag.  ¨ Leave the ice on for 20 minutes, 2-3 times per day for the first 1-2 days.  · Sit on a large, rubber or inflated ring or cushion to ease your pain. Lean forward when you are sitting to help decrease  discomfort.  · Avoid sitting for long periods of time.  · Increase your activity as the pain allows. Perform any exercises that are recommended by your health care provider or physical therapist.  · If you have pain during bowel movements, use stool softeners as directed by your health care provider.  · Eat a diet that includes plenty of fiber to help prevent constipation.  · Keep all follow-up visits as directed by your health care provider. This is important.  How is this prevented?  Wear appropriate padding and sports gear when bicycling and rowing. This can help to prevent developing an injury that is caused by repeated strain or friction.  Contact a health care provider if:  · Your pain becomes worse.  · Your bowel movements cause a great deal of discomfort.  · You are unable to have a bowel movement.  · You have uncontrolled urine loss (urinary incontinence).  · You have a fever.  This information is not intended to replace advice given to you by your health care provider. Make sure you discuss any questions you have with your health care provider.  Document Released: 12/15/2001 Document Revised: 08/17/2017 Document Reviewed: 12/14/2015  [employer] Patient Education © 2017 [employer]

## 2018-09-01 NOTE — ED TRIAGE NOTES
"Chief Complaint   Patient presents with   • T-5000 GLF     MGLF 2 weeks ago, fell onto concrete onto tailbone   • Tailbone Pain     reports pain and swelling to tailbone and low back x3 days.      Pt ambulatory to triage for above. Reports she was seen at PCP for a different back pain on 8/23 and dx'd with UTI, took and finished antibiotics. Thought initially the pain was connected but now remains with the tailbone pain.   NAD noted, VSS.    [facility] Educated on [facility]age process and to inform staff of any changes.     /82   Pulse 86   Temp 36.8 °C (98.2 °F) (Temporal)   Resp 16   Ht 1.803 m (5' 11\")   Wt 95.5 kg (210 lb 8.6 oz)   SpO2 96%   BMI 29.36 kg/m²     "

## 2018-09-01 NOTE — ED PROVIDER NOTES
ED Provider Note    CHIEF COMPLAINT  Chief Complaint   Patient presents with   • T-5000 GLF     MGLF 2 weeks ago, fell onto concrete onto tailbone   • Tailbone Pain     reports pain and swelling to tailbone and low back x3 days.        HPI  Hoda Trevino is a 33 y.o. female who presents with cc of tailbone pain. Fell onto step 3 weeks PTA. Reports pain worse on sitting and walking. No radiculopathy, no fevers or constitutional sxs. No urinary sxs at this point but recently finished course of PO abx for UTI. No vaginal sxs. No blood or pus on BMs.  denies bowel or bladder incontinence  denies ivdu. denies fevers/constitutional sxs  denies saddle paresthesias  denies focal weakness/numbness  Able to ambulate    REVIEW OF SYSTEMS  Review of Systems   Gastrointestinal: Negative for abdominal pain.   Genitourinary: Negative for dysuria, flank pain, frequency, hematuria and urgency.   All other systems reviewed and are negative.    See HPI for further details. All other systems are negative.     PAST MEDICAL HISTORY       SOCIAL HISTORY  Social History     Social History Main Topics   • Smoking status: Current Every Day Smoker     Packs/day: 0.50     Years: 20.00     Types: Cigarettes   • Smokeless tobacco: Never Used   • Alcohol use No   • Drug use: Yes     Types: Inhaled, Marijuana      Comment: 1.75g/week    • Sexual activity: Yes     Partners: Male     Birth control/ protection: IUD       SURGICAL HISTORY  patient denies any surgical history    CURRENT MEDICATIONS  Home Medications     Reviewed by Ap Wills R.N. (Registered Nurse) on 08/31/18 at 1924  Med List Status: Partial   Medication Last Dose Status   buPROPion (WELLBUTRIN) 100 MG Tab 8/23/2018 Active                ALLERGIES  Allergies   Allergen Reactions   • Amoxicillin        PHYSICAL EXAM  Physical Exam   Constitutional: She is oriented to person, place, and time. She appears well-developed and well-nourished.   HENT:   Head: Normocephalic and  atraumatic.   Cardiovascular: Normal rate and regular rhythm.    Pulmonary/Chest: Effort normal and breath sounds normal.   Abdominal: Soft. Bowel sounds are normal. She exhibits no distension. There is no tenderness. There is no rebound.   Musculoskeletal:   Mild paraspsinal ttp of BL musculature of lumbar spine, no midline TTP, pain at coccyx without bony abn    Neurological: She is alert and oriented to person, place, and time.   Skin: Skin is warm and dry.         COURSE & MEDICAL DECISION MAKING  Pertinent Labs & Imaging studies reviewed. (See chart for details)  Patient here with ongoing tailbone and low back pain after a fall. We'll evaluate for possible fracture with x-ray. Oblique fractures unlikely. Patient without any blood or pus on bowel movements. Give patient with a history of recent urinary tract infection will check urinalysis. Urinalysis is normal. Patient's imaging is normal. I'll send patient home with supportive care and patient understands to return if symptoms fail to resolve despite this. Patient is feeling improved following analgesic in the emergency department. Patient without any bowel or bladder continence or saddle paresthesias or other signs consistent with acute cord compression and therefore do not believe that advanced imaging is currently communicating this patient.  The patient will not drink alcohol nor drive with prescribed medications. The patient will return for worsening symptoms and is stable at the time of discharge. The patient verbalizes understanding and will comply.    FINAL IMPRESSION  1. Coccyx contusion, lumbar strain         Electronically signed by: Cayden Williamson, 8/31/2018 7:42 PM

## 2018-09-05 ENCOUNTER — OFFICE VISIT (OUTPATIENT)
Dept: MEDICAL GROUP | Facility: CLINIC | Age: 33
End: 2018-09-05
Payer: MEDICAID

## 2018-09-05 ENCOUNTER — HOSPITAL ENCOUNTER (OUTPATIENT)
Facility: MEDICAL CENTER | Age: 33
End: 2018-09-05
Attending: PHYSICIAN ASSISTANT
Payer: MEDICAID

## 2018-09-05 VITALS
HEIGHT: 69 IN | BODY MASS INDEX: 30.96 KG/M2 | RESPIRATION RATE: 16 BRPM | OXYGEN SATURATION: 98 % | SYSTOLIC BLOOD PRESSURE: 132 MMHG | DIASTOLIC BLOOD PRESSURE: 78 MMHG | WEIGHT: 209 LBS | HEART RATE: 64 BPM | TEMPERATURE: 97.4 F

## 2018-09-05 DIAGNOSIS — N12 PYELONEPHRITIS: ICD-10-CM

## 2018-09-05 DIAGNOSIS — K52.1 ANTIBIOTIC-ASSOCIATED DIARRHEA: ICD-10-CM

## 2018-09-05 DIAGNOSIS — T36.95XA ANTIBIOTIC-ASSOCIATED DIARRHEA: ICD-10-CM

## 2018-09-05 DIAGNOSIS — F17.200 READY TO QUIT SMOKING: ICD-10-CM

## 2018-09-05 LAB — AMBIGUOUS DTTM AMBI4: NORMAL

## 2018-09-05 PROCEDURE — 81003 URINALYSIS AUTO W/O SCOPE: CPT

## 2018-09-05 PROCEDURE — 99213 OFFICE O/P EST LOW 20 MIN: CPT | Performed by: PHYSICIAN ASSISTANT

## 2018-09-05 RX ORDER — NICOTINE 14MG/24HR
1 PATCH, TRANSDERMAL 24 HOURS TRANSDERMAL
Qty: 60 CAP | Refills: 0 | Status: SHIPPED | OUTPATIENT
Start: 2018-09-05 | End: 2019-11-15

## 2018-09-05 RX ORDER — SULFAMETHOXAZOLE AND TRIMETHOPRIM 800; 160 MG/1; MG/1
1 TABLET ORAL EVERY 12 HOURS
Qty: 28 TAB | Refills: 0 | Status: SHIPPED | OUTPATIENT
Start: 2018-09-05 | End: 2018-09-19

## 2018-09-05 NOTE — ASSESSMENT & PLAN NOTE
This patient continues to have low back pain with cramping and general malaise. She has completed one round of cipro with resolution of nitrites and leukocytes in her urine but with continued blood and discomfort. She now has developed diarrhea and fell recently so has low back muscle spasms as well.

## 2018-09-05 NOTE — ASSESSMENT & PLAN NOTE
This patient has been eating yoplait whipped yogurt rather than the probiotic yogurt that was suggested and has developed diarrhea since taking cipro.

## 2018-09-05 NOTE — ASSESSMENT & PLAN NOTE
This patient and her  continue to work on quitting smoking. They are smoking about 5 cigarettes per day each, down from at least 1 pack per day each previously. They are working together to quit and although it is a struggle, they continue to try.

## 2018-09-05 NOTE — PROGRESS NOTES
Chief Complaint   Patient presents with   • Hospital Follow-up     [facility]       HISTORY OF PRESENT ILLNESS: Patient is a 33 y.o. female established patient who presents today for evaluation and management of:    Pyelonephritis  This patient continues to have low back pain with cramping and general malaise. She has completed one round of cipro with resolution of nitrites and leukocytes in her urine but with continued blood and discomfort. She now has developed diarrhea and fell recently so has low back muscle spasms as well.     Antibiotic-associated diarrhea  This patient has been eating yoplait whipped yogurt rather than the probiotic yogurt that was suggested and has developed diarrhea since taking cipro.     Ready to quit smoking  This patient and her  continue to work on quitting smoking. They are smoking about 5 cigarettes per day each, down from at least 1 pack per day each previously. They are working together to quit and although it is a struggle, they continue to try.        Patient Active Problem List    Diagnosis Date Noted   • Antibiotic-associated diarrhea 09/05/2018   • Pyelonephritis 08/23/2018   • Muscle strain of right upper back 04/26/2018   • Ready to quit smoking 04/26/2018   • Obesity (BMI 30-39.9) 03/07/2018   • Bipolar 2 disorder (HCC) 03/07/2018   • History of abnormal cervical Papanicolaou smear 03/07/2018       Allergies:Amoxicillin    Current Outpatient Prescriptions   Medication Sig Dispense Refill   • sulfamethoxazole-trimethoprim (BACTRIM DS) 800-160 MG tablet Take 1 Tab by mouth every 12 hours for 14 days. 28 Tab 0   • Saccharomyces boulardii (PROBIOTIC) 250 MG Cap Take 1 Cap by mouth 2 times daily with meals as needed. 60 Cap 0   • ketorolac (TORADOL) 10 MG Tab Take 1 Tab by mouth every four hours as needed for Moderate Pain or Severe Pain for up to 10 days. 30 Tab 0   • buPROPion (WELLBUTRIN) 100 MG Tab Take 1 Tab by mouth 2 times a day. 60 Tab 5     No current  "facility-administered medications for this visit.        Social History   Substance Use Topics   • Smoking status: Current Every Day Smoker     Packs/day: 0.25     Years: 20.00     Types: Cigarettes   • Smokeless tobacco: Never Used   • Alcohol use No       Family Status   Relation Status   • Mo Alive   • Fa    • Sis Alive   • Bro Alive   • Child Alive   • MGMo    • MGFa    • PGMo    • PGFa      Family History   Problem Relation Age of Onset   • Kidney Disease Mother    • Psychiatry Mother    • Heart Disease Father    • Kidney Disease Father    • Diabetes Father    • Heart Disease Sister    • Depression Sister    • GI Sister    • No Known Problems Child    • Dementia Maternal Grandmother    • Breast Cancer Paternal Grandmother    • Lung Cancer Paternal Grandfather         possible asbestos       Review of Systems: See HPI above.   Constitutional: positive for fever, chills, weight loss and malaise/fatigue.   HENT: Negative for ear pain, nosebleeds, congestion, sore throat and neck pain.    Eyes: Negative for blurred vision.   Respiratory: Negative for cough, sputum production, shortness of breath and wheezing.    Cardiovascular: Negative for chest pain, palpitations, orthopnea and leg swelling.   Gastrointestinal: Negative for heartburn, nausea, vomiting and positive for suprapubic abdominal pain and cramping.   Genitourinary: Negative for dysuria, urgency and frequency.   Musculoskeletal: positive for myalgias, low back pain   Skin: Negative for rash and itching.   Neurological: Negative for dizziness, tingling, tremors, sensory change, focal weakness and headaches.     Exam:  Blood pressure 132/78, pulse 64, temperature 36.3 °C (97.4 °F), resp. rate 16, height 1.753 m (5' 9\"), weight 94.8 kg (209 lb), SpO2 98 %.  Body mass index is 30.86 kg/m².  General:  Obese female in NAD  Head: is grossly normal.  Neck: Supple without masses. Thyroid is not visibly enlarged.  Pulmonary: " Clear to ausculation. Normal effort. No rales, ronchi, or wheezing.  Cardiovascular: Regular rate and rhythm without murmur. Carotid and radial pulses are intact and equal bilaterally.  Extremities: no clubbing, cyanosis, or edema.    Medical decision-making and discussion:  1. Pyelonephritis    - URINALYSIS,CULTURE IF INDICATED; Future  - sulfamethoxazole-trimethoprim (BACTRIM DS) 800-160 MG tablet; Take 1 Tab by mouth every 12 hours for 14 days.  Dispense: 28 Tab; Refill: 0  - US-RENAL; Future    2. Antibiotic-associated diarrhea    - Saccharomyces boulardii (PROBIOTIC) 250 MG Cap; Take 1 Cap by mouth 2 times daily with meals as needed.  Dispense: 60 Cap; Refill: 0      Please note that this dictation was created using voice recognition software. I have made every reasonable attempt to correct obvious errors, but I expect that there are errors of grammar and possibly content that I did not discover before finalizing the note.      Return if symptoms worsen or fail to improve.

## 2018-09-06 LAB
APPEARANCE UR: CLEAR
BILIRUB UR QL STRIP.AUTO: NEGATIVE
COLOR UR: YELLOW
GLUCOSE UR STRIP.AUTO-MCNC: NEGATIVE MG/DL
KETONES UR STRIP.AUTO-MCNC: NEGATIVE MG/DL
LEUKOCYTE ESTERASE UR QL STRIP.AUTO: NEGATIVE
MICRO URNS: NORMAL
NITRITE UR QL STRIP.AUTO: NEGATIVE
PH UR STRIP.AUTO: 6 [PH]
PROT UR QL STRIP: NEGATIVE MG/DL
RBC UR QL AUTO: NEGATIVE
SP GR UR STRIP.AUTO: 1.01
UROBILINOGEN UR STRIP.AUTO-MCNC: 0.2 MG/DL

## 2018-09-14 ENCOUNTER — APPOINTMENT (OUTPATIENT)
Dept: RADIOLOGY | Facility: MEDICAL CENTER | Age: 33
End: 2018-09-14
Attending: PHYSICIAN ASSISTANT
Payer: MEDICAID

## 2019-11-15 ENCOUNTER — OFFICE VISIT (OUTPATIENT)
Dept: URGENT CARE | Facility: PHYSICIAN GROUP | Age: 34
End: 2019-11-15
Payer: COMMERCIAL

## 2019-11-15 VITALS
DIASTOLIC BLOOD PRESSURE: 82 MMHG | HEART RATE: 79 BPM | OXYGEN SATURATION: 97 % | WEIGHT: 219 LBS | SYSTOLIC BLOOD PRESSURE: 122 MMHG | HEIGHT: 69 IN | RESPIRATION RATE: 16 BRPM | BODY MASS INDEX: 32.44 KG/M2 | TEMPERATURE: 98.4 F

## 2019-11-15 DIAGNOSIS — J01.00 ACUTE NON-RECURRENT MAXILLARY SINUSITIS: ICD-10-CM

## 2019-11-15 PROCEDURE — 99214 OFFICE O/P EST MOD 30 MIN: CPT | Performed by: NURSE PRACTITIONER

## 2019-11-15 RX ORDER — DOXYCYCLINE HYCLATE 100 MG
100 TABLET ORAL 2 TIMES DAILY
Qty: 14 TAB | Refills: 0 | Status: SHIPPED | OUTPATIENT
Start: 2019-11-15 | End: 2019-11-22

## 2019-11-15 ASSESSMENT — ENCOUNTER SYMPTOMS
DIARRHEA: 0
SHORTNESS OF BREATH: 0
DOUBLE VISION: 0
WHEEZING: 0
CHILLS: 0
HEADACHES: 1
PALPITATIONS: 0
SORE THROAT: 0
MUSCULOSKELETAL NEGATIVE: 1
SINUS PRESSURE: 1
SINUS PAIN: 1
SWOLLEN GLANDS: 0
SPUTUM PRODUCTION: 0
DIZZINESS: 0
STRIDOR: 0
COUGH: 0
CONSTIPATION: 0
ABDOMINAL PAIN: 0
VOMITING: 0
BLURRED VISION: 0
FEVER: 0
NAUSEA: 0

## 2019-11-15 NOTE — PROGRESS NOTES
"Subjective:   Hoda Hope is a 34 y.o. female who presents for Sinus Problem (x 4 days)        Sinusitis   This is a new problem. The current episode started in the past 7 days (Started 4-5 days ago). The problem has been gradually worsening since onset. There has been no fever. The pain is moderate. Associated symptoms include congestion, headaches and sinus pressure. Pertinent negatives include no chills, coughing, ear pain, shortness of breath, sore throat or swollen glands. Past treatments include oral decongestants. The treatment provided mild relief.        Review of Systems   Constitutional: Negative for chills and fever.   HENT: Positive for congestion, sinus pressure and sinus pain. Negative for ear discharge, ear pain and sore throat.    Eyes: Negative for blurred vision and double vision.   Respiratory: Negative for cough, sputum production, shortness of breath, wheezing and stridor.    Cardiovascular: Negative for chest pain and palpitations.   Gastrointestinal: Negative for abdominal pain, constipation, diarrhea, nausea and vomiting.   Musculoskeletal: Negative.    Skin: Negative.  Negative for itching and rash.   Neurological: Positive for headaches. Negative for dizziness.   All other systems reviewed and are negative.    Patient's PMH, SocHx, SurgHx, FamHx, Drug allergies and medications reviewed.     Objective:   /82   Pulse 79   Temp 36.9 °C (98.4 °F) (Temporal)   Resp 16   Ht 1.753 m (5' 9\")   Wt 99.3 kg (219 lb)   SpO2 97%   BMI 32.34 kg/m²   Physical Exam  Vitals signs reviewed.   Constitutional:       Appearance: She is well-developed.   HENT:      Head: Normocephalic.      Right Ear: Hearing and ear canal normal. No middle ear effusion. Tympanic membrane is not perforated or erythematous.      Left Ear: Hearing and ear canal normal. A middle ear effusion is present. Tympanic membrane is not perforated or erythematous.      Nose: No rhinorrhea.      Right Turbinates: " Swollen.      Right Sinus: Maxillary sinus tenderness present. No frontal sinus tenderness.      Left Sinus: Maxillary sinus tenderness present. No frontal sinus tenderness.      Mouth/Throat:      Lips: Pink.      Mouth: Mucous membranes are moist.      Pharynx: Uvula midline. Oropharyngeal exudate present. No posterior oropharyngeal erythema or uvula swelling.      Tonsils: No tonsillar exudate.   Eyes:      General: Lids are normal.      Extraocular Movements: Extraocular movements intact.      Conjunctiva/sclera: Conjunctivae normal.      Pupils: Pupils are equal, round, and reactive to light.   Neck:      Musculoskeletal: Normal range of motion.      Thyroid: No thyromegaly.   Cardiovascular:      Rate and Rhythm: Normal rate and regular rhythm.      Heart sounds: Normal heart sounds.   Pulmonary:      Effort: Pulmonary effort is normal. No respiratory distress.      Breath sounds: Normal breath sounds. No wheezing.   Lymphadenopathy:      Head:      Right side of head: No submandibular or tonsillar adenopathy.      Left side of head: No submandibular or tonsillar adenopathy.   Skin:     General: Skin is warm and dry.      Findings: No rash.   Neurological:      Mental Status: She is alert and oriented to person, place, and time.   Psychiatric:         Mood and Affect: Mood normal.         Speech: Speech normal.         Behavior: Behavior normal. Behavior is cooperative.         Thought Content: Thought content normal.         Judgment: Judgment normal.           Assessment/Plan:   Assessment    1. Acute non-recurrent maxillary sinusitis  - doxycycline (VIBRAMYCIN) 100 MG Tab; Take 1 Tab by mouth 2 times a day for 7 days.  Dispense: 14 Tab; Refill: 0    Patient encouraged to increase clear liquid intake  Use over-the-counter Flonase daily, one spray each nostril in the morning.    Differential diagnosis, natural history, supportive care, and indications for immediate follow-up discussed.     **Please note that  all invasive procedures during this visit were performed by myself and/or the Medical Assistant under the supervision of the PA or MD in office**

## 2019-11-15 NOTE — LETTER
November 15, 2019         Patient: Hoda Hope   YOB: 1985   Date of Visit: 11/15/2019           To Whom it May Concern:    Hoda Hope was seen in my clinic on 11/15/2019. Please excuse her from work 11/15/2019 through 11/16/2019. She may return to work on 11/17/2019.    If you have any questions or concerns, please don't hesitate to call.        Sincerely,           ADRIANNE Leslie.  Electronically Signed

## 2021-03-10 ENCOUNTER — TELEMEDICINE (OUTPATIENT)
Dept: MEDICAL GROUP | Facility: CLINIC | Age: 36
End: 2021-03-10
Payer: MEDICAID

## 2021-03-10 DIAGNOSIS — R20.0 NUMBNESS AND TINGLING SENSATION OF SKIN: ICD-10-CM

## 2021-03-10 DIAGNOSIS — R23.3 EASY BRUISING: ICD-10-CM

## 2021-03-10 DIAGNOSIS — Z13.21 ENCOUNTER FOR VITAMIN DEFICIENCY SCREENING: ICD-10-CM

## 2021-03-10 DIAGNOSIS — R20.2 NUMBNESS AND TINGLING SENSATION OF SKIN: ICD-10-CM

## 2021-03-10 PROCEDURE — 99203 OFFICE O/P NEW LOW 30 MIN: CPT | Performed by: PHYSICIAN ASSISTANT

## 2021-03-10 NOTE — ASSESSMENT & PLAN NOTE
This is a new condition according to the patient and her .  Patient has noticed over the last few weeks that she bruises a lot more easily than she normally would.  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  We will do lab work follow-up with her in 2 weeks with the results.

## 2021-03-10 NOTE — PROGRESS NOTES
Virtual Visit: New Patient   This visit was conducted via Zoom using secure and encrypted videoconferencing technology. The patient was in a private location in the state of [place].    The patient's identity was confirmed and verbal consent was obtained for this virtual visit.    Subjective:     CC:   Chief Complaint   Patient presents with   • Establish Care   • Nerve Pain     motor       Hoda Hope is a 35 y.o. female presenting to establish care and to discuss the evaluation and management of:    Numbness and tingling sensation of skin  This is a chronic condition for this patient what has been going on intermittently for the last year.  She has not been treated for this before.  Patient is very concerned as her father had motor neuropathy before he .  She is not having any symptoms of motor neuropathy.  She is having problems with her toes going numb.  She states that her toes go numb on her right foot more than her left foot and that it happens quite frequently.  She states occasionally that numbness travels up her foot and has traveled one time as far as just below her knee.  She states that the numbness will continue for a few minutes and then she will have the tingling/pins-and-needles feeling as her sensation returns.  She states that she also gets this same sensation across her mid back about the level of her bra line.  She has no history of back injury and no pain. She states that she started a new job last week and that is aggravating her symptoms.  The ability to do a more thorough evaluation is limited by being on telemedicine.  I have ordered lab work and we will follow-up with her in 2 weeks in the office to do a more thorough examination.    Easy bruising  This is a new condition according to the patient and her .  Patient has noticed over the last few weeks that she bruises a lot more easily than she normally would.  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  We will do lab work follow-up with her in 2 weeks with the results.      ROS  See HPI  Constitutional: Negative for fever, chills and malaise/fatigue.   HENT: Negative for congestion.    Eyes: Negative for pain.   Respiratory: Negative for cough and shortness of breath.    Cardiovascular: Negative for leg swelling.   Gastrointestinal: Negative for nausea, vomiting, abdominal pain and diarrhea.   Genitourinary: Negative for dysuria and hematuria.   Skin: Negative for rash.   Neurological: Positive for numbness and tingling in feet and mid back.  Negative for dizziness, focal weakness and headaches.   Endo/Heme/Allergies: Positive for easy bruising.  Does not bleed easily.   Psychiatric/Behavioral: Negative for depression.  The patient is not nervous/anxious.      Allergies   Allergen Reactions   • Amoxicillin Hives   • Iodine Hives       Current medicines (including changes today)  No current outpatient medications on file.     No current facility-administered medications for this visit.       She  has a past medical history of Allergy, Anxiety, Depression, and Migraine. She also has no past medical history of Asthma, Diabetes ([facility]), Hyperlipidemia, Hypertension, IBD (inflammatory bowel disease), Seizure ([facility]), or Substance abuse ([facility]).  She  has a past surgical history that includes hand surgery (Left, 2006) and tenolysis tendon of the hand (Left, 2009).      Family History   Problem Relation Age of Onset   • Kidney Disease Mother    • Psychiatric Illness Mother    • Heart Disease Father    • Kidney Disease Father    • Diabetes Father    • Hypertension Father    • Other Father         motor neuropath   • Heart Disease Sister    • Depression Sister    • GI Disease Sister    • No Known Problems Brother    • No Known Problems Child    • Dementia Maternal Grandmother     • Breast Cancer Paternal Grandmother    • Cancer Paternal Grandmother    • Lung Cancer Paternal Grandfather         possible asbestos   • Diabetes Paternal Grandfather      Family Status   Relation Name Status   • Mo  Alive   • Fa     • Sis  Alive   • Bro  Alive   • Child  Alive   • MGMo     • MGFa     • PGMo     • PGFa         Patient Active Problem List    Diagnosis Date Noted   • Numbness and tingling sensation of skin 03/10/2021   • Easy bruising 03/10/2021   • Encounter for vitamin deficiency screening 03/10/2021   • Antibiotic-associated diarrhea 2018   • Pyelonephritis 2018   • Muscle strain of right upper back 2018   • Ready to quit smoking 2018   • Obesity (BMI 30-39.9) 2018   • Bipolar 2 disorder (HCC) 2018   • History of abnormal cervical Papanicolaou smear 2018          Objective:   There were no vitals taken for this visit.    Physical Exam:  Constitutional: Alert, no distress, well-groomed.  Skin: No rashes in visible areas.  Eye: Round. Conjunctiva clear, lids normal. No icterus.   ENMT: Lips pink without lesions, good dentition, moist mucous membranes. Phonation normal.  Neck: No masses, no thyromegaly. Moves freely without pain.  Respiratory: Unlabored respiratory effort, no cough or audible wheeze  Psych: Alert and oriented x3, normal affect and mood.       Assessment and Plan:   The following treatment plan was discussed:     1. Numbness and tingling sensation of skin    2. Easy bruising  - CBC WITH DIFFERENTIAL; Future  - Comp Metabolic Panel; Future    3. Encounter for vitamin deficiency screening  - VITAMIN D,25 HYDROXY; Future        Follow-up: Return in about 2 weeks (around 3/24/2021) for f/u numbness.

## 2021-03-10 NOTE — ASSESSMENT & PLAN NOTE
This is a chronic condition for this patient what has been going on intermittently for the last year.  She has not been treated for this before.  Patient is very concerned as her father had motor neuropathy before he .  She is not having any symptoms of motor neuropathy.  She is having problems with her toes going numb.  She states that her toes go numb on her right foot more than her left foot and that it happens quite frequently.  She states occasionally that numbness travels up her foot and has traveled one time as far as just below her knee.  She states that the numbness will continue for a few minutes and then she will have the tingling/pins-and-needles feeling as her sensation returns.  She states that she also gets this same sensation across her mid back about the level of her bra line.  She has no history of back injury and no pain. She states that she started a new job last week and that is aggravating her symptoms.  The ability to do a more thorough evaluation is limited by being on telemedicine.  I have ordered lab work and we will follow-up with her in 2 weeks in the office to do a more thorough examination.

## 2021-04-01 ENCOUNTER — HOSPITAL ENCOUNTER (OUTPATIENT)
Dept: LAB | Facility: MEDICAL CENTER | Age: 36
End: 2021-04-01
Attending: PHYSICIAN ASSISTANT
Payer: MEDICAID

## 2021-04-01 DIAGNOSIS — Z13.21 ENCOUNTER FOR VITAMIN DEFICIENCY SCREENING: ICD-10-CM

## 2021-04-01 DIAGNOSIS — R23.3 EASY BRUISING: ICD-10-CM

## 2021-04-01 PROCEDURE — 80053 COMPREHEN METABOLIC PANEL: CPT

## 2021-04-01 PROCEDURE — 85025 COMPLETE CBC W/AUTO DIFF WBC: CPT

## 2021-04-01 PROCEDURE — 36415 COLL VENOUS BLD VENIPUNCTURE: CPT

## 2021-04-01 PROCEDURE — 82306 VITAMIN D 25 HYDROXY: CPT

## 2021-04-02 LAB
ALBUMIN SERPL BCP-MCNC: 4.4 G/DL (ref 3.2–4.9)
ALBUMIN/GLOB SERPL: 1.7 G/DL
ALP SERPL-CCNC: 94 U/L (ref 30–99)
ALT SERPL-CCNC: 24 U/L (ref 2–50)
ANION GAP SERPL CALC-SCNC: 8 MMOL/L (ref 7–16)
AST SERPL-CCNC: 22 U/L (ref 12–45)
BASOPHILS # BLD AUTO: 0.2 % (ref 0–1.8)
BASOPHILS # BLD: 0.03 K/UL (ref 0–0.12)
BILIRUB SERPL-MCNC: 0.6 MG/DL (ref 0.1–1.5)
BUN SERPL-MCNC: 11 MG/DL (ref 8–22)
CALCIUM SERPL-MCNC: 8.9 MG/DL (ref 8.5–10.5)
CHLORIDE SERPL-SCNC: 104 MMOL/L (ref 96–112)
CO2 SERPL-SCNC: 26 MMOL/L (ref 20–33)
CREAT SERPL-MCNC: 0.77 MG/DL (ref 0.5–1.4)
EOSINOPHIL # BLD AUTO: 0.13 K/UL (ref 0–0.51)
EOSINOPHIL NFR BLD: 1.1 % (ref 0–6.9)
ERYTHROCYTE [DISTWIDTH] IN BLOOD BY AUTOMATED COUNT: 47.4 FL (ref 35.9–50)
GLOBULIN SER CALC-MCNC: 2.6 G/DL (ref 1.9–3.5)
GLUCOSE SERPL-MCNC: 88 MG/DL (ref 65–99)
HCT VFR BLD AUTO: 50.8 % (ref 37–47)
HGB BLD-MCNC: 16.2 G/DL (ref 12–16)
IMM GRANULOCYTES # BLD AUTO: 0.13 K/UL (ref 0–0.11)
IMM GRANULOCYTES NFR BLD AUTO: 1.1 % (ref 0–0.9)
LYMPHOCYTES # BLD AUTO: 2.85 K/UL (ref 1–4.8)
LYMPHOCYTES NFR BLD: 23.2 % (ref 22–41)
MCH RBC QN AUTO: 29.5 PG (ref 27–33)
MCHC RBC AUTO-ENTMCNC: 31.9 G/DL (ref 33.6–35)
MCV RBC AUTO: 92.4 FL (ref 81.4–97.8)
MONOCYTES # BLD AUTO: 1.05 K/UL (ref 0–0.85)
MONOCYTES NFR BLD AUTO: 8.5 % (ref 0–13.4)
NEUTROPHILS # BLD AUTO: 8.11 K/UL (ref 2–7.15)
NEUTROPHILS NFR BLD: 65.9 % (ref 44–72)
NRBC # BLD AUTO: 0 K/UL
NRBC BLD-RTO: 0 /100 WBC
PLATELET # BLD AUTO: 259 K/UL (ref 164–446)
PMV BLD AUTO: 11.4 FL (ref 9–12.9)
POTASSIUM SERPL-SCNC: 4 MMOL/L (ref 3.6–5.5)
PROT SERPL-MCNC: 7 G/DL (ref 6–8.2)
RBC # BLD AUTO: 5.5 M/UL (ref 4.2–5.4)
SODIUM SERPL-SCNC: 138 MMOL/L (ref 135–145)
WBC # BLD AUTO: 12.3 K/UL (ref 4.8–10.8)

## 2021-04-04 LAB — 25(OH)D3 SERPL-MCNC: 32 NG/ML (ref 30–80)

## 2021-05-05 ENCOUNTER — OFFICE VISIT (OUTPATIENT)
Dept: MEDICAL GROUP | Facility: CLINIC | Age: 36
End: 2021-05-05
Payer: MEDICAID

## 2021-05-05 VITALS
OXYGEN SATURATION: 97 % | BODY MASS INDEX: 31.4 KG/M2 | TEMPERATURE: 97.1 F | HEART RATE: 64 BPM | HEIGHT: 69 IN | WEIGHT: 212 LBS | SYSTOLIC BLOOD PRESSURE: 122 MMHG | DIASTOLIC BLOOD PRESSURE: 64 MMHG | RESPIRATION RATE: 12 BRPM

## 2021-05-05 DIAGNOSIS — R79.89 ABNORMAL COMPLETE BLOOD COUNT: ICD-10-CM

## 2021-05-05 DIAGNOSIS — R20.0 NUMBNESS AND TINGLING SENSATION OF SKIN: ICD-10-CM

## 2021-05-05 DIAGNOSIS — R20.2 NUMBNESS AND TINGLING SENSATION OF SKIN: ICD-10-CM

## 2021-05-05 DIAGNOSIS — L84 CALLUS OF FOOT: ICD-10-CM

## 2021-05-05 DIAGNOSIS — Z13.21 ENCOUNTER FOR VITAMIN DEFICIENCY SCREENING: ICD-10-CM

## 2021-05-05 PROBLEM — T36.95XA ANTIBIOTIC-ASSOCIATED DIARRHEA: Status: RESOLVED | Noted: 2018-09-05 | Resolved: 2021-05-05

## 2021-05-05 PROBLEM — K52.1 ANTIBIOTIC-ASSOCIATED DIARRHEA: Status: RESOLVED | Noted: 2018-09-05 | Resolved: 2021-05-05

## 2021-05-05 PROCEDURE — 99214 OFFICE O/P EST MOD 30 MIN: CPT | Performed by: PHYSICIAN ASSISTANT

## 2021-05-05 ASSESSMENT — FIBROSIS 4 INDEX: FIB4 SCORE: 0.62

## 2021-05-05 NOTE — PROGRESS NOTES
Chief Complaint   Patient presents with   • Follow-Up     labs, big toes       HISTORY OF PRESENT ILLNESS: Patient is a 36 y.o. female established patient who presents today to discuss the following issues:    Numbness and tingling sensation of skin  Patient states that this is much improved since her last visit.  Patient states she is much more active and is starting to take the stairs at work.  This requires her to climb 6 flights of stairs multiple times a day.  She stated that since she started doing this she has had a significant decrease in the amount of pain, numbness and tingling.  She will continue with this lifestyle modification.    Callus of foot  Patient has large calluses on the medial aspect of both great toes. She complains of pain and numbness after being on feet all day long. States that she shaves them with a callus shaver every week. Discussed proper fitting shoes and socks, cushioning and other strategies to help prevent and diminish calluses.     Abnormal complete blood count  Patient states that she was not feeling well when she had lab work done. She was fighting a URI at the time. CBC is abnormal. Possible iron deficiency starting. We will test iron, B12 and folate levels along with another CBC at next visit.        4/1/2021    WBC 12.3 (H)   RBC 5.50 (H)   Hemoglobin 16.2 (H)   Hematocrit 50.8 (H)   MCV 92.4   MCH 29.5   MCHC 31.9 (L)   RDW 47.4   Platelet Count 259   MPV 11.4       Encounter for vitamin deficiency screening  Vitamin D levels are within normal limits.      Patient Active Problem List    Diagnosis Date Noted   • Abnormal complete blood count 05/06/2021   • Callus of foot 05/05/2021   • Numbness and tingling sensation of skin 03/10/2021   • Easy bruising 03/10/2021   • Encounter for vitamin deficiency screening 03/10/2021   • Pyelonephritis 08/23/2018   • Muscle strain of right upper back 04/26/2018   • Ready to quit smoking 04/26/2018   • Obesity (BMI 30-39.9) 03/07/2018   •  Bipolar 2 disorder (HCC) 03/07/2018   • History of abnormal cervical Papanicolaou smear 03/07/2018       Allergies:Amoxicillin and Iodine    No current outpatient medications on file.     No current facility-administered medications for this visit.       Hospital Outpatient Visit on 04/01/2021   Component Date Value Ref Range Status   • 25-Hydroxy   Vitamin D 25 04/01/2021 32  30 - 80 ng/mL Final    Comment: INTERPRETIVE INFORMATION: Vitamin D, 25-Hydroxy  This assay accurately quantifies the sum of vitamin D3, 25-hydroxy  and vitamin D2, 25-hydroxy.  0-17 years:  Deficiency: less than 20 ng/mL  Optimum level: greater than or equal to 20 ng/mL*  *(Rod CL et al. Pediatrics 2008; 122: 1142-52.)  18 years and older:  Deficiency: Less than 20 ng/mL  Insufficiency: 20-29 ng/mL  Optimum Level: 30-80 ng/mL  Possible Toxicity: Greater than 150 ng/mL  Performed By: [facility]  [address] [place] [id number]  : Wendy Jordan MD     • Sodium 04/01/2021 138  135 - 145 mmol/L Final   • Potassium 04/01/2021 4.0  3.6 - 5.5 mmol/L Final   • Chloride 04/01/2021 104  96 - 112 mmol/L Final   • Co2 04/01/2021 26  20 - 33 mmol/L Final   • Anion Gap 04/01/2021 8.0  7.0 - 16.0 Final   • Glucose 04/01/2021 88  65 - 99 mg/dL Final   • Bun 04/01/2021 11  8 - 22 mg/dL Final   • Creatinine 04/01/2021 0.77  0.50 - 1.40 mg/dL Final   • Calcium 04/01/2021 8.9  8.5 - 10.5 mg/dL Final   • AST(SGOT) 04/01/2021 22  12 - 45 U/L Final   • ALT(SGPT) 04/01/2021 24  2 - 50 U/L Final   • Alkaline Phosphatase 04/01/2021 94  30 - 99 U/L Final   • Total Bilirubin 04/01/2021 0.6  0.1 - 1.5 mg/dL Final   • Albumin 04/01/2021 4.4  3.2 - 4.9 g/dL Final   • Total Protein 04/01/2021 7.0  6.0 - 8.2 g/dL Final   • Globulin 04/01/2021 2.6  1.9 - 3.5 g/dL Final   • A-G Ratio 04/01/2021 1.7  g/dL Final   • WBC 04/01/2021 12.3* 4.8 - 10.8 K/uL Final   • RBC 04/01/2021 5.50* 4.20 - 5.40 M/uL Final   • Hemoglobin 04/01/2021 16.2*  12.0 - 16.0 g/dL Final   • Hematocrit 04/01/2021 50.8* 37.0 - 47.0 % Final   • MCV 04/01/2021 92.4  81.4 - 97.8 fL Final   • MCH 04/01/2021 29.5  27.0 - 33.0 pg Final   • MCHC 04/01/2021 31.9* 33.6 - 35.0 g/dL Final   • RDW 04/01/2021 47.4  35.9 - 50.0 fL Final   • Platelet Count 04/01/2021 259  164 - 446 K/uL Final   • MPV 04/01/2021 11.4  9.0 - 12.9 fL Final   • Neutrophils-Polys 04/01/2021 65.90  44.00 - 72.00 % Final   • Lymphocytes 04/01/2021 23.20  22.00 - 41.00 % Final   • Monocytes 04/01/2021 8.50  0.00 - 13.40 % Final   • Eosinophils 04/01/2021 1.10  0.00 - 6.90 % Final   • Basophils 04/01/2021 0.20  0.00 - 1.80 % Final   • Immature Granulocytes 04/01/2021 1.10* 0.00 - 0.90 % Final   • Nucleated RBC 04/01/2021 0.00  /100 WBC Final   • Neutrophils (Absolute) 04/01/2021 8.11* 2.00 - 7.15 K/uL Final    Includes immature neutrophils, if present.   • Lymphs (Absolute) 04/01/2021 2.85  1.00 - 4.80 K/uL Final   • Monos (Absolute) 04/01/2021 1.05* 0.00 - 0.85 K/uL Final   • Eos (Absolute) 04/01/2021 0.13  0.00 - 0.51 K/uL Final   • Baso (Absolute) 04/01/2021 0.03  0.00 - 0.12 K/uL Final   • Immature Granulocytes (abs) 04/01/2021 0.13* 0.00 - 0.11 K/uL Final   • NRBC (Absolute) 04/01/2021 0.00  K/uL Final   • GFR If  04/01/2021 >60  >60 mL/min/1.73 m 2 Final   • GFR If Non  04/01/2021 >60  >60 mL/min/1.73 m 2 Final   ]    The ASCVD Risk score (Caitlinelisabeth LOZANO Jr., et al., 2013) failed to calculate for the following reasons:    The 2013 ASCVD risk score is only valid for ages 40 to 79    Social History     Tobacco Use   • Smoking status: Current Every Day Smoker     Packs/day: 1.00     Years: 25.00     Pack years: 25.00     Types: Cigarettes     Start date: 3/10/1995   • Smokeless tobacco: Never Used   Substance Use Topics   • Alcohol use: No     Comment: occasionally   • Drug use: Yes     Frequency: 21.0 times per week     Types: Inhaled, Marijuana     Comment: 1.75g/week        Family Status    Relation Name Status   • Mo  Alive   • Fa     • Sis  Alive   • Bro  Alive   • Child  Alive   • MGMo     • MGFa     • PGMo     • PGFa       Family History   Problem Relation Age of Onset   • Kidney Disease Mother    • Psychiatric Illness Mother    • Heart Disease Father    • Kidney Disease Father    • Diabetes Father    • Hypertension Father    • Other Father         motor neuropath   • Heart Disease Sister    • Depression Sister    • GI Disease Sister    • No Known Problems Brother    • No Known Problems Child    • Dementia Maternal Grandmother    • Breast Cancer Paternal Grandmother    • Cancer Paternal Grandmother    • Lung Cancer Paternal Grandfather         possible asbestos   • Diabetes Paternal Grandfather        No LMP recorded.    Health Maintenance Summary                IMM PNEUMOCOCCAL VACCINE: 0-64 Years Overdue 3/14/1991     COVID-19 Vaccine Overdue 3/14/2001     PAP SMEAR Overdue 3/22/2021      Done 3/22/2018 THINPREP PAP WITH HPV     Patient has more history with this topic...    IMM INFLUENZA Next Due 2021     IMM DTaP/Tdap/Td Vaccine Next Due 3/7/2028      Done 3/7/2018 Imm Admin: Tdap Vaccine     Patient has more history with this topic...           Review of Systems:   Constitutional: Negative for fever, chills, weight change, fatigue, loss of appetite.  HNT: Negative for nosebleeds, congestion, odynophagia, sore throat or changes in taste.    Eyes: Negative for vision changes.   Ears: Negative for recent changes in hearing, pain or discharge.  Neck: Negative for pain, swelling, lumps or goiter.  Respiratory: Negative for cough, sputum production, shortness of breath and wheezing.    Cardiovascular: Negative for chest pain, palpitations, orthopnea and leg swelling.   Gastrointestinal: Negative for constipation, diarrhea, heartburn, dysphagia, nausea, vomiting or abdominal pain.   Genitourinary: Negative for dysuria, urgency and frequency.  "  Musculoskeletal: Negative for myalgias, joint pain, and back pain.  Skin: Positive for callus on bilateral great toes with pain, numbness and tingling around the site. Negative for other skin, hair or nail changes, rash, itching.   Neurological: Negative for dizziness, tingling, tremors, sensory change, gait/coordination changes, focal weakness and headaches.   Endo/Heme/Allergies: Does not bruise/bleed easily.   Psychiatric/Behavioral: Negative for depression, suicidal ideas and memory loss.  The patient is not nervous/anxious and does not have insomnia.        Exam:  /64 (BP Location: Right arm, Patient Position: Sitting)   Pulse 64   Temp 36.2 °C (97.1 °F) (Temporal)   Resp 12   Ht 1.753 m (5' 9\")   Wt 96.2 kg (212 lb)   SpO2 97%  Body mass index is 31.31 kg/m².  General:  Well nourished, well developed female. Body mass index is 31.31 kg/m². No apparent distress.  Eyes: EOM intact, PERRL, conjunctiva non-injected, sclera non-icteric.  Neck: Supple with no cervical lymphadenopathy, JVD, palpable thyroid nodules or carotid bruits.  Pulmonary: Clear to ausculation bilaterally. Normal effort. No rales, ronchi, or wheezing.  Cardiovascular: Regular rate and rhythm without murmur, rub or gallop.   Extremities: Full range of motion. Warm and well perfused with no edema.  Skin: Intact with no obvious rashes or lesions.  Neuro: Cranial nerves I-XII intact.  Psych: Alert and oriented x 3.  Appropriately dressed. Mood and affect appropriate.      Assessment/Plan:  1. Encounter for vitamin deficiency screening     2. Callus of foot     3. Abnormal complete blood count  CBC WITH DIFFERENTIAL    FOLATE    IRON/TOTAL IRON BIND    VITAMIN B12   4. Numbness and tingling sensation of skin         Reviewed risks and benefits of treatment plan. Patient verbally agrees to plan of care.     Return in about 6 months (around 11/5/2021) for f/u labs.    Please note that this dictation was created using voice recognition " software. I have made every reasonable attempt to correct obvious errors, but I expect that there are errors of grammar and possibly content that I did not discover before finalizing the note.

## 2021-05-05 NOTE — ASSESSMENT & PLAN NOTE
Patient states that this is much improved since her last visit.  Patient states she is much more active and is starting to take the stairs at work.  This requires her to climb 6 flights of stairs multiple times a day.  She stated that since she started doing this she has had a significant decrease in the amount of pain, numbness and tingling.  She will continue with this lifestyle modification.

## 2021-05-05 NOTE — ASSESSMENT & PLAN NOTE
Patient has large calluses on the medial aspect of both great toes. She complains of pain and numbness after being on feet all day long. States that she shaves them with a callus shaver every week. Discussed proper fitting shoes and socks, cushioning and other strategies to help prevent and diminish calluses.

## 2021-05-06 PROBLEM — R79.89 ABNORMAL COMPLETE BLOOD COUNT: Status: ACTIVE | Noted: 2021-05-06

## 2021-05-06 NOTE — ASSESSMENT & PLAN NOTE
Patient states that she was not feeling well when she had lab work done. She was fighting a URI at the time. CBC is abnormal. Possible iron deficiency starting. We will test iron, B12 and folate levels along with another CBC at next visit.        4/1/2021    WBC 12.3 (H)   RBC 5.50 (H)   Hemoglobin 16.2 (H)   Hematocrit 50.8 (H)   MCV 92.4   MCH 29.5   MCHC 31.9 (L)   RDW 47.4   Platelet Count 259   MPV 11.4

## 2021-06-06 ENCOUNTER — HOSPITAL ENCOUNTER (EMERGENCY)
Facility: MEDICAL CENTER | Age: 36
End: 2021-06-06
Attending: EMERGENCY MEDICINE
Payer: MEDICAID

## 2021-06-06 VITALS
HEIGHT: 69 IN | HEART RATE: 70 BPM | WEIGHT: 200.18 LBS | SYSTOLIC BLOOD PRESSURE: 120 MMHG | OXYGEN SATURATION: 95 % | DIASTOLIC BLOOD PRESSURE: 70 MMHG | BODY MASS INDEX: 29.65 KG/M2 | RESPIRATION RATE: 16 BRPM | TEMPERATURE: 97.3 F

## 2021-06-06 DIAGNOSIS — S40.869A: ICD-10-CM
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
## 2021-06-06 PROCEDURE — 700111 HCHG RX REV CODE 636 W/ 250 OVERRIDE (IP): Performed by: EMERGENCY MEDICINE

## 2021-06-06 PROCEDURE — 700102 HCHG RX REV CODE 250 W/ 637 OVERRIDE(OP): Performed by: EMERGENCY MEDICINE

## 2021-06-06 PROCEDURE — A9270 NON-COVERED ITEM OR SERVICE: HCPCS | Performed by: EMERGENCY MEDICINE

## 2021-06-06 PROCEDURE — 99283 EMERGENCY DEPT VISIT LOW MDM: CPT

## 2021-06-06 RX ORDER — FAMOTIDINE 20 MG/1
20 TABLET, FILM COATED ORAL ONCE
Status: COMPLETED | OUTPATIENT
Start: 2021-06-06 | End: 2021-06-06

## 2021-06-06 RX ORDER — PREDNISONE 20 MG/1
40 TABLET ORAL ONCE
Status: COMPLETED | OUTPATIENT
Start: 2021-06-06 | End: 2021-06-06

## 2021-06-06 RX ORDER — DIPHENHYDRAMINE HCL 25 MG
25-50 CAPSULE ORAL EVERY 6 HOURS PRN
Qty: 30 CAPSULE | Refills: 0 | Status: SHIPPED | OUTPATIENT
Start: 2021-06-06 | End: 2021-11-05

## 2021-06-06 RX ORDER — PREDNISONE 20 MG/1
40 TABLET ORAL DAILY
Qty: 8 TABLET | Refills: 0 | Status: SHIPPED | OUTPATIENT
Start: 2021-06-06 | End: 2021-06-10

## 2021-06-06 RX ORDER — DIPHENHYDRAMINE HCL 25 MG
50 TABLET ORAL ONCE
Status: COMPLETED | OUTPATIENT
Start: 2021-06-06 | End: 2021-06-06

## 2021-06-06 RX ADMIN — FAMOTIDINE 20 MG: 20 TABLET ORAL at 10:04

## 2021-06-06 RX ADMIN — DIPHENHYDRAMINE HYDROCHLORIDE 50 MG: 25 TABLET ORAL at 10:04

## 2021-06-06 RX ADMIN — PREDNISONE 40 MG: 20 TABLET ORAL at 10:03

## 2021-06-06 ASSESSMENT — ENCOUNTER SYMPTOMS
VOMITING: 0
COUGH: 0
HEADACHES: 0
FEVER: 0

## 2021-06-06 ASSESSMENT — FIBROSIS 4 INDEX: FIB4 SCORE: 0.62

## 2021-06-06 NOTE — ED NOTES
Discharge instructions reviewed and signed with patient. All questions answered at this time. Patient ambulated out of ED with a steady gait.

## 2021-06-06 NOTE — Clinical Note
Hoda Hope was seen and treated in our emergency department on 6/6/2021.  She may return to work on 06/07/2021.  Hoda Hope was seen and evaluated in the emergency room today, June 6, 2021     If you have any questions or concerns, please don't hesitate to call.      Terra Daly D.O.

## 2021-06-06 NOTE — ED TRIAGE NOTES
Ambulates to triage  Chief Complaint   Patient presents with   • Allergic Reaction     large swollen areas to arms, L ahnd and neck from possible bug bite     First noticed it 4 days ago on her R arm, has a large amount os swelling to L hand and R FA. Took benadryl with no relief.

## 2021-06-11 ENCOUNTER — TELEMEDICINE (OUTPATIENT)
Dept: MEDICAL GROUP | Facility: CLINIC | Age: 36
End: 2021-06-11
Payer: MEDICAID

## 2021-06-11 VITALS — WEIGHT: 194 LBS | BODY MASS INDEX: 28.73 KG/M2 | HEIGHT: 69 IN

## 2021-06-11 DIAGNOSIS — R20.0 NUMBNESS AND TINGLING SENSATION OF SKIN: ICD-10-CM
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
## 2021-06-11 DIAGNOSIS — R20.2 NUMBNESS AND TINGLING SENSATION OF SKIN: ICD-10-CM
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
## 2021-06-11 PROCEDURE — 99213 OFFICE O/P EST LOW 20 MIN: CPT | Mod: CR | Performed by: PHYSICIAN ASSISTANT

## 2021-06-11 ASSESSMENT — FIBROSIS 4 INDEX: FIB4 SCORE: 0.62

## 2021-06-12 NOTE — ASSESSMENT & PLAN NOTE
This is a chronic condition for this patient.  She is having worsening pain and numbness across mid-back.  We will order an x-ray and follow-up with the results.  At that time we will consider referral to physical therapy to help strengthen the spine and improve her posture which may improve this numb spot in the location where her bra clasps are.

## 2021-06-12 NOTE — PROGRESS NOTES
Virtual Visit: Established Patient   This visit was conducted via Zoom using secure and encrypted videoconferencing technology. The patient was in a private location in the state of [place].    The patient's identity was confirmed and verbal consent was obtained for this virtual visit.    Subjective:   CC:   Chief Complaint   Patient presents with   • Allergic Reaction     both arms, swelling (L) arm, (R) arm felt as if it is burning        Hoda Hope is a 36 y.o. female presenting for evaluation and management of:    Numbness and tingling sensation of skin  This is a chronic condition for this patient.  She is having worsening pain and numbness across mid-back.  We will order an x-ray and follow-up with the results.  At that time we will consider referral to physical therapy to help strengthen the spine and improve her posture which may improve this numb spot in the location where her bra clasps are.    Bug bite without infection  Was recently seen in the emergency room for allergic reaction to mosquito bites.  Her symptoms include localized swelling, warmth and erythema.  She was given prednisone and sent home.  We discussed different ways to prevent her from being bit.  And if she does get bit ways to treat her bites when they swell.  Recommend benadryl, hydrocortisone cream and bug repellant.    ROS   Denies any recent fevers or chills. No nausea or vomiting. No chest pains or shortness of breath.     Allergies   Allergen Reactions   • Amoxicillin Hives   • Iodine Hives       Current medicines (including changes today)  Current Outpatient Medications   Medication Sig Dispense Refill   • PREDNISONE PO Take  by mouth.     • diphenhydrAMINE (BENADRYL) 25 MG capsule Take 1-2 Capsules by mouth every 6 hours as needed for Itching or Rash. 30 capsule 0     No current facility-administered medications for this visit.       Patient Active Problem List    Diagnosis Date Noted   • Bug bite without infection  "06/11/2021   • Abnormal complete blood count 05/06/2021   • Callus of foot 05/05/2021   • Numbness and tingling sensation of skin 03/10/2021   • Easy bruising 03/10/2021   • Encounter for vitamin deficiency screening 03/10/2021   • Pyelonephritis 08/23/2018   • Muscle strain of right upper back 04/26/2018   • Ready to quit smoking 04/26/2018   • Obesity (BMI 30-39.9) 03/07/2018   • Bipolar 2 disorder (HCC) 03/07/2018   • History of abnormal cervical Papanicolaou smear 03/07/2018       Family History   Problem Relation Age of Onset   • Kidney Disease Mother    • Psychiatric Illness Mother    • Heart Disease Father    • Kidney Disease Father    • Diabetes Father    • Hypertension Father    • Other Father         motor neuropath   • Heart Disease Sister    • Depression Sister    • GI Disease Sister    • No Known Problems Brother    • No Known Problems Child    • Dementia Maternal Grandmother    • Breast Cancer Paternal Grandmother    • Cancer Paternal Grandmother    • Lung Cancer Paternal Grandfather         possible asbestos   • Diabetes Paternal Grandfather        She  has a past medical history of Allergy, Antibiotic-associated diarrhea (9/5/2018), Anxiety, Depression, and Migraine. She also has no past medical history of Asthma, Diabetes ([facility]), Hyperlipidemia, Hypertension, IBD (inflammatory bowel disease), Seizure ([facility]), or Substance abuse ([facility]).  She  has a past surgical history that includes hand surgery (Left, 2006) and tenolysis tendon of the hand (Left, 2009).       Objective:   Ht 1.753 m (5' 9\")   Wt 88 kg (194 lb)   BMI 28.65 kg/m²     Physical Exam:  Constitutional: Alert, no distress, well-groomed.  Skin: No rashes in visible areas.  Eye: Round. Conjunctiva clear, lids normal. No icterus.   ENMT: Lips pink without lesions, good dentition, moist mucous membranes. Phonation normal.  Neck: No masses, no thyromegaly. Moves freely without pain.  Respiratory: Unlabored respiratory effort, no cough or audible " wheeze  Psych: Alert and oriented x3, normal affect and mood.       Assessment and Plan:   The following treatment plan was discussed:     1. Numbness and tingling sensation of skin  - DX-THORACIC SPINE-2 VIEWS; Future    2. Bug bite without infection, subsequent encounter    Other orders  - PREDNISONE PO; Take  by mouth.        Follow-up: Return if symptoms worsen or fail to improve.

## 2021-06-12 NOTE — ASSESSMENT & PLAN NOTE
Was recently seen in the emergency room for allergic reaction to mosquito bites.  Her symptoms include localized swelling, warmth and erythema.  She was given prednisone and sent home.  We discussed different ways to prevent her from being bit.  And if she does get bit ways to treat her bites when they swell.  Recommend benadryl, hydrocortisone cream and bug repellant.

## 2021-06-24 ENCOUNTER — HOSPITAL ENCOUNTER (OUTPATIENT)
Dept: LAB | Facility: MEDICAL CENTER | Age: 36
End: 2021-06-24
Attending: PHYSICIAN ASSISTANT
Payer: MEDICAID

## 2021-06-24 ENCOUNTER — APPOINTMENT (OUTPATIENT)
Dept: URGENT CARE | Facility: PHYSICIAN GROUP | Age: 36
End: 2021-06-24
Payer: MEDICAID

## 2021-06-24 ENCOUNTER — APPOINTMENT (OUTPATIENT)
Dept: RADIOLOGY | Facility: IMAGING CENTER | Age: 36
End: 2021-06-24
Attending: PHYSICIAN ASSISTANT
Payer: MEDICAID

## 2021-06-24 DIAGNOSIS — R20.2 NUMBNESS AND TINGLING SENSATION OF SKIN: ICD-10-CM

## 2021-06-24 DIAGNOSIS — R79.89 ABNORMAL COMPLETE BLOOD COUNT: ICD-10-CM

## 2021-06-24 DIAGNOSIS — R20.0 NUMBNESS AND TINGLING SENSATION OF SKIN: ICD-10-CM

## 2021-06-24 PROCEDURE — 85025 COMPLETE CBC W/AUTO DIFF WBC: CPT

## 2021-06-24 PROCEDURE — 83550 IRON BINDING TEST: CPT

## 2021-06-24 PROCEDURE — 83540 ASSAY OF IRON: CPT

## 2021-06-24 PROCEDURE — 82607 VITAMIN B-12: CPT

## 2021-06-24 PROCEDURE — 36415 COLL VENOUS BLD VENIPUNCTURE: CPT

## 2021-06-24 PROCEDURE — 82746 ASSAY OF FOLIC ACID SERUM: CPT

## 2021-06-24 PROCEDURE — 72070 X-RAY EXAM THORAC SPINE 2VWS: CPT | Mod: TC,FY | Performed by: PHYSICIAN ASSISTANT

## 2021-06-25 LAB
BASOPHILS # BLD AUTO: 0.5 % (ref 0–1.8)
BASOPHILS # BLD: 0.06 K/UL (ref 0–0.12)
EOSINOPHIL # BLD AUTO: 0.17 K/UL (ref 0–0.51)
EOSINOPHIL NFR BLD: 1.4 % (ref 0–6.9)
ERYTHROCYTE [DISTWIDTH] IN BLOOD BY AUTOMATED COUNT: 47.3 FL (ref 35.9–50)
FOLATE SERPL-MCNC: 9.1 NG/ML
HCT VFR BLD AUTO: 46.9 % (ref 37–47)
HGB BLD-MCNC: 15.4 G/DL (ref 12–16)
IMM GRANULOCYTES # BLD AUTO: 0.06 K/UL (ref 0–0.11)
IMM GRANULOCYTES NFR BLD AUTO: 0.5 % (ref 0–0.9)
IRON SATN MFR SERPL: 32 % (ref 15–55)
IRON SERPL-MCNC: 73 UG/DL (ref 40–170)
LYMPHOCYTES # BLD AUTO: 2.57 K/UL (ref 1–4.8)
LYMPHOCYTES NFR BLD: 20.7 % (ref 22–41)
MCH RBC QN AUTO: 29.7 PG (ref 27–33)
MCHC RBC AUTO-ENTMCNC: 32.8 G/DL (ref 33.6–35)
MCV RBC AUTO: 90.5 FL (ref 81.4–97.8)
MONOCYTES # BLD AUTO: 0.83 K/UL (ref 0–0.85)
MONOCYTES NFR BLD AUTO: 6.7 % (ref 0–13.4)
NEUTROPHILS # BLD AUTO: 8.72 K/UL (ref 2–7.15)
NEUTROPHILS NFR BLD: 70.2 % (ref 44–72)
NRBC # BLD AUTO: 0 K/UL
NRBC BLD-RTO: 0 /100 WBC
PLATELET # BLD AUTO: 212 K/UL (ref 164–446)
PMV BLD AUTO: 11.3 FL (ref 9–12.9)
RBC # BLD AUTO: 5.18 M/UL (ref 4.2–5.4)
TIBC SERPL-MCNC: 229 UG/DL (ref 250–450)
UIBC SERPL-MCNC: 156 UG/DL (ref 110–370)
VIT B12 SERPL-MCNC: 200 PG/ML (ref 211–911)
WBC # BLD AUTO: 12.4 K/UL (ref 4.8–10.8)

## 2021-07-29 DIAGNOSIS — S29.012S MUSCLE STRAIN OF RIGHT UPPER BACK, SEQUELA: ICD-10-CM

## 2021-08-29 ENCOUNTER — OFFICE VISIT (OUTPATIENT)
Dept: URGENT CARE | Facility: PHYSICIAN GROUP | Age: 36
End: 2021-08-29
Payer: COMMERCIAL

## 2021-08-29 VITALS
TEMPERATURE: 98.3 F | HEART RATE: 79 BPM | OXYGEN SATURATION: 97 % | WEIGHT: 187.8 LBS | BODY MASS INDEX: 27.81 KG/M2 | HEIGHT: 69 IN | SYSTOLIC BLOOD PRESSURE: 110 MMHG | RESPIRATION RATE: 16 BRPM | DIASTOLIC BLOOD PRESSURE: 78 MMHG

## 2021-08-29 DIAGNOSIS — M79.671 RIGHT FOOT PAIN: ICD-10-CM

## 2021-08-29 PROCEDURE — 99214 OFFICE O/P EST MOD 30 MIN: CPT | Performed by: PHYSICIAN ASSISTANT

## 2021-08-29 RX ORDER — CHOLECALCIFEROL (VITAMIN D3) 125 MCG
500 CAPSULE ORAL 2 TIMES DAILY
COMMUNITY
End: 2022-05-06

## 2021-08-29 ASSESSMENT — FIBROSIS 4 INDEX: FIB4 SCORE: 0.76

## 2021-08-29 NOTE — PROGRESS NOTES
Chief Complaint   Patient presents with   • Foot Pain     spot on the bottom of her foot x 4 months       HISTORY OF PRESENT ILLNESS: Patient is a 36 y.o. female who presents today for the following:    Patient has been having left foot pain for the last 4 months.    Patient Active Problem List    Diagnosis Date Noted   • Bug bite without infection 06/11/2021   • Abnormal complete blood count 05/06/2021   • Callus of foot 05/05/2021   • Numbness and tingling sensation of skin 03/10/2021   • Easy bruising 03/10/2021   • Encounter for vitamin deficiency screening 03/10/2021   • Pyelonephritis 08/23/2018   • Muscle strain of right upper back 04/26/2018   • Ready to quit smoking 04/26/2018   • Obesity (BMI 30-39.9) 03/07/2018   • Bipolar 2 disorder (HCC) 03/07/2018   • History of abnormal cervical Papanicolaou smear 03/07/2018       Allergies:Amoxicillin and Iodine    Current Outpatient Medications Ordered in Epic   Medication Sig Dispense Refill   • cyanocobalamin (VITAMIN B-12) 500 MCG Tab Take 500 mcg by mouth 2 times a day.     • PREDNISONE PO Take  by mouth. (Patient not taking: Reported on 8/29/2021)     • diphenhydrAMINE (BENADRYL) 25 MG capsule Take 1-2 Capsules by mouth every 6 hours as needed for Itching or Rash. (Patient not taking: Reported on 8/29/2021) 30 capsule 0     No current Epic-ordered facility-administered medications on file.       Past Medical History:   Diagnosis Date   • Allergy     amoxacillin, iodine   • Antibiotic-associated diarrhea 9/5/2018   • Anxiety    • Depression    • Migraine        Social History     Tobacco Use   • Smoking status: Current Every Day Smoker     Packs/day: 1.00     Years: 25.00     Pack years: 25.00     Types: Cigarettes     Start date: 3/10/1995   • Smokeless tobacco: Never Used   Vaping Use   • Vaping Use: Never used   Substance Use Topics   • Alcohol use: No     Comment: occasionally   • Drug use: Yes     Frequency: 21.0 times per week     Types: Inhaled, Marijuana  "    Comment: 1.75g/week        Family Status   Relation Name Status   • Mo  Alive   • Fa     • Sis  Alive   • Bro  Alive   • Child  Alive   • MGMo     • MGFa     • PGMo     • PGFa       Family History   Problem Relation Age of Onset   • Kidney Disease Mother    • Psychiatric Illness Mother    • Heart Disease Father    • Kidney Disease Father    • Diabetes Father    • Hypertension Father    • Other Father         motor neuropath   • Heart Disease Sister    • Depression Sister    • GI Disease Sister    • No Known Problems Brother    • No Known Problems Child    • Dementia Maternal Grandmother    • Breast Cancer Paternal Grandmother    • Cancer Paternal Grandmother    • Lung Cancer Paternal Grandfather         possible asbestos   • Diabetes Paternal Grandfather        Review of Systems:    Constitutional ROS: No unexpected change in weight, No weakness, No fatigue  Pulmonary ROS: No chronic cough, sputum, or hemoptysis, No dyspnea on exertion, No wheezing  Cardiovascular ROS: No diaphoresis, No edema, No palpitations  Musculoskeletal/Extremities ROS: Left foot pain  Hematologic/Lymphatic ROS: No chills, No night sweats, No weight loss  Skin/Integumentary ROS: No edema, No evidence of rash, No itching      Exam:  /78   Pulse 79   Temp 36.8 °C (98.3 °F) (Temporal)   Resp 16   Ht 1.753 m (5' 9\")   Wt 85.2 kg (187 lb 12.8 oz)   SpO2 97%   General: Well developed, well nourished. No distress.    HENT: Head is grossly normal.  Pulmonary: Unlabored respiratory effort.   Neurologic: Grossly nonfocal. No facial asymmetry noted.  Musculoskeletal: Lesion on the plantar surface of the left foot between the metatarsal heads 3 and 4.  The area is tender to touch.  Toes 3 and 4 are mildly tender better without erythema, edema, or discrete lesions.  The lesion on the sole has multiple spots on it, most consistent with a plantar wart.  There is diffuse tenderness in the area.  Patient " walks with a limp.  Skin: Warm, dry, good turgor. No rashes in visible areas.   Psych: Normal mood. Alert and oriented to person, place and time.    Assessment/Plan:  Discussed over-the-counter products to be used for wart removal.  Referring to podiatry as well in case his symptoms do not resolve.  Discussed appropriate over-the-counter symptomatic medication, and when to return to clinic. Follow up for worsening or persistent symptoms.  1. Right foot pain  REFERRAL TO PODIATRY

## 2021-08-29 NOTE — LETTER
August 29, 2021         Patient: Hoda Hope   YOB: 1985   Date of Visit: 8/29/2021           To Whom it May Concern:    Hoda Hope was seen in my clinic on 8/29/2021. She may return to work 8/30/21.    If you have any questions or concerns, please don't hesitate to call.        Sincerely,           Marta Land P.A.-C.  Electronically Signed

## 2021-11-03 ENCOUNTER — TELEPHONE (OUTPATIENT)
Dept: MEDICAL GROUP | Facility: CLINIC | Age: 36
End: 2021-11-03

## 2021-11-03 NOTE — TELEPHONE ENCOUNTER
VOICEMAIL  1. Caller Name: Hoda Hope                        Call Back Number: [phone] (home)       2. Message: pt LVM stating that she could not get into her mychart and wanted to relay the message on the days that she needed a note for work. In her chart there is a conversation and it does look like the dates were given. However, I LVM informing her that there is a letter in her mychart to print out.     3. Patient approves office to leave a detailed voicemail/MyChart message: N\A

## 2021-11-05 ENCOUNTER — OFFICE VISIT (OUTPATIENT)
Dept: MEDICAL GROUP | Facility: CLINIC | Age: 36
End: 2021-11-05
Payer: COMMERCIAL

## 2021-11-05 VITALS
HEART RATE: 80 BPM | DIASTOLIC BLOOD PRESSURE: 80 MMHG | OXYGEN SATURATION: 95 % | WEIGHT: 185 LBS | HEIGHT: 69 IN | SYSTOLIC BLOOD PRESSURE: 112 MMHG | BODY MASS INDEX: 27.4 KG/M2 | TEMPERATURE: 97.6 F | RESPIRATION RATE: 16 BRPM

## 2021-11-05 DIAGNOSIS — J01.00 ACUTE NON-RECURRENT MAXILLARY SINUSITIS: ICD-10-CM

## 2021-11-05 DIAGNOSIS — B07.0 PLANTAR WART: ICD-10-CM

## 2021-11-05 DIAGNOSIS — Z97.5 IUD (INTRAUTERINE DEVICE) IN PLACE: ICD-10-CM

## 2021-11-05 PROCEDURE — 99213 OFFICE O/P EST LOW 20 MIN: CPT | Performed by: PHYSICIAN ASSISTANT

## 2021-11-05 RX ORDER — DOXYCYCLINE HYCLATE 100 MG
100 TABLET ORAL 2 TIMES DAILY
Qty: 14 TABLET | Refills: 0 | Status: SHIPPED | OUTPATIENT
Start: 2021-11-05 | End: 2022-05-06

## 2021-11-05 ASSESSMENT — FIBROSIS 4 INDEX: FIB4 SCORE: 0.76

## 2021-11-05 NOTE — PATIENT INSTRUCTIONS
Make an appointment with the podiatrist for your foot.    FLORENCIA NICOLE  [address] [address]  Phone: [phone]

## 2021-12-08 PROBLEM — Z97.5 IUD (INTRAUTERINE DEVICE) IN PLACE: Status: ACTIVE | Noted: 2021-12-08

## 2021-12-08 ASSESSMENT — ENCOUNTER SYMPTOMS
GASTROINTESTINAL NEGATIVE: 1
CONSTITUTIONAL NEGATIVE: 1
RESPIRATORY NEGATIVE: 1
SINUS PAIN: 1
EYES NEGATIVE: 1
CARDIOVASCULAR NEGATIVE: 1
SORE THROAT: 0
MUSCULOSKELETAL NEGATIVE: 1
PSYCHIATRIC NEGATIVE: 1
NEUROLOGICAL NEGATIVE: 1

## 2021-12-08 ASSESSMENT — VISUAL ACUITY: OU: 1

## 2021-12-08 NOTE — PROGRESS NOTES
Subjective   Hoda Hope is a 36 y.o. female who presents with Sinus Pain, Contraception (IUD), and Warts    1. Acute non-recurrent maxillary sinusitis  Patient has had nasal congestion, sinus pain and green mucus for approximately 5 days.  Patient states that her symptoms are getting worse and she would like to be treated.  We will start her on doxycycline 100 mg twice a day for 7 days.  She will return to the clinic if this medication does not help her sinus pain or if she is develops new or worsening symptoms.   doxycycline (VIBRAMYCIN) 100 MG Tab; Take 1 Tablet by mouth 2 times a day.  Dispense: 14 Tablet; Refill: 0    2. Plantar wart  Patient has a plantar wart on the palm of her right foot.  She had a referral to podiatry placed by urgent care.  That referral has been approved and she needs to make an appointment.  She states she will make the appointment today when she leaves.    3. IUD (intrauterine device) in place  Patient currently has IUD in place.  She would like it checked to be sure it is where it belongs.  I have recommended she establish with a gynecologist so that they can evaluate location and address any issues that may arise.  Patient is agreeable to this plan of action and will establish with someone immediately.    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  No date: Anxiety  No date: Depression  No date: Migraine  Past Surgical History:  2009: TENOLYSIS TENDON OF THE HAND; Left      Comment:  thumb tendon release  2006: HAND SURGERY; Left      Comment:  cyst removal  Social History    Tobacco Use      Smoking status: Current Every Day Smoker        Packs/day: 0.50        Years: 25.00        Pack years: 12.5        Types: Cigarettes        Start date: 3/10/1995      Smokeless tobacco: Never Used    Vaping Use      Vaping Use: Never used    Alcohol use: No    Drug use: Yes      Frequency: 21.0 times per week       Types: Inhaled, Marijuana      Comment: 1.75g/week     Review of patient's family history indicates:  Problem: Kidney Disease      Relation: Mother          Age of Onset: (Not Specified)  Problem: Psychiatric Illness      Relation: Mother          Age of Onset: (Not Specified)  Problem: Heart Disease      Relation: Father          Age of Onset: (Not Specified)  Problem: Kidney Disease      Relation: Father          Age of Onset: (Not Specified)  Problem: Diabetes      Relation: Father          Age of Onset: (Not Specified)  Problem: Hypertension      Relation: Father          Age of Onset: (Not Specified)  Problem: Other      Relation: Father          Age of Onset: (Not Specified)          Comment: motor neuropath  Problem: Heart Disease      Relation: Sister          Age of Onset: (Not Specified)  Problem: Depression      Relation: Sister          Age of Onset: (Not Specified)  Problem: GI Disease      Relation: Sister          Age of Onset: (Not Specified)  Problem: No Known Problems      Relation: Brother          Age of Onset: (Not Specified)  Problem: No Known Problems      Relation: Child          Age of Onset: (Not Specified)  Problem: Dementia      Relation: Maternal Grandmother          Age of Onset: (Not Specified)  Problem: Breast Cancer      Relation: Paternal Grandmother          Age of Onset: (Not Specified)  Problem: Cancer      Relation: Paternal Grandmother          Age of Onset: (Not Specified)  Problem: Lung Cancer      Relation: Paternal Grandfather          Age of Onset: (Not Specified)          Comment: possible asbestos  Problem: Diabetes      Relation: Paternal Grandfather          Age of Onset: (Not Specified)      Current Outpatient Medications: •  doxycycline (VIBRAMYCIN) 100 MG Tab, Take 1 Tablet by mouth 2 times a day., Disp: 14 Tablet, Rfl: 0•  cyanocobalamin (VITAMIN B-12) 500 MCG Tab, Take 500 mcg by mouth 2 times a day., Disp: , Rfl:     Patient was instructed on the use of  "medications, either prescriptions or OTC and informed on when the appropriate follow up time period should be. In addition, patient was also instructed that should any acute worsening occur that they should notify this clinic asap or call 911.      Review of Systems   Constitutional: Negative.    HENT: Positive for congestion and sinus pain. Negative for ear pain and sore throat.    Eyes: Negative.    Respiratory: Negative.    Cardiovascular: Negative.    Gastrointestinal: Negative.    Genitourinary: Negative.    Musculoskeletal: Negative.         Pain on bottom of right foot   Skin: Negative.    Neurological: Negative.    Endo/Heme/Allergies: Negative.    Psychiatric/Behavioral: Negative.      Objective     /80 (BP Location: Left arm, Patient Position: Sitting, BP Cuff Size: Adult)   Pulse 80   Temp 36.4 °C (97.6 °F) (Temporal)   Resp 16   Ht 1.753 m (5' 9\") Comment: obtained from chart  Wt 83.9 kg (185 lb) Comment: with shoes on  SpO2 95%   BMI 27.32 kg/m²      Physical Exam  Vitals and nursing note reviewed.   Constitutional:       Appearance: Normal appearance. She is well-developed and well-groomed. She is not ill-appearing or toxic-appearing.   HENT:      Head: Normocephalic and atraumatic.      Nose: Nasal tenderness, mucosal edema, congestion and rhinorrhea present.      Right Turbinates: Enlarged.      Left Turbinates: Enlarged.      Right Sinus: Maxillary sinus tenderness and frontal sinus tenderness present.      Left Sinus: Maxillary sinus tenderness and frontal sinus tenderness present.      Mouth/Throat:      Lips: Pink. No lesions.      Mouth: Mucous membranes are moist.   Eyes:      General: Lids are normal. Vision grossly intact. Gaze aligned appropriately.      Extraocular Movements: Extraocular movements intact.      Conjunctiva/sclera: Conjunctivae normal.      Pupils: Pupils are equal, round, and reactive to light.   Neck:      Thyroid: No thyromegaly.      Vascular: No carotid " bruit or JVD.      Trachea: Trachea and phonation normal.   Cardiovascular:      Rate and Rhythm: Normal rate and regular rhythm.      Heart sounds: Normal heart sounds. No murmur heard.  No friction rub. No gallop.    Pulmonary:      Effort: Pulmonary effort is normal.      Breath sounds: Normal breath sounds. No wheezing, rhonchi or rales.   Musculoskeletal:         General: Normal range of motion.      Cervical back: Normal range of motion and neck supple.      Right lower leg: No edema.      Left lower leg: No edema.   Feet:      Comments: Patient has plantar wart on the ball of right foot that is causing discomfort and difficulty walking.  Lymphadenopathy:      Cervical: No cervical adenopathy.   Skin:     General: Skin is warm and dry.      Capillary Refill: Capillary refill takes less than 2 seconds.      Findings: No lesion or rash.   Neurological:      Mental Status: She is alert and oriented to person, place, and time.      Cranial Nerves: Cranial nerves are intact.   Psychiatric:         Attention and Perception: Attention and perception normal.         Mood and Affect: Mood and affect normal.         Speech: Speech normal.         Behavior: Behavior normal. Behavior is cooperative.         Thought Content: Thought content normal.         Judgment: Judgment normal.       Assessment & Plan      1. Acute non-recurrent maxillary sinusitis  - doxycycline (VIBRAMYCIN) 100 MG Tab; Take 1 Tablet by mouth 2 times a day.  Dispense: 14 Tablet; Refill: 0    2. Plantar wart    3. IUD (intrauterine device) in place

## 2022-03-31 ENCOUNTER — OCCUPATIONAL MEDICINE (OUTPATIENT)
Dept: OCCUPATIONAL MEDICINE | Facility: CLINIC | Age: 37
End: 2022-03-31
Payer: COMMERCIAL

## 2022-03-31 VITALS
BODY MASS INDEX: 25.21 KG/M2 | WEIGHT: 180.1 LBS | TEMPERATURE: 98.4 F | DIASTOLIC BLOOD PRESSURE: 72 MMHG | SYSTOLIC BLOOD PRESSURE: 130 MMHG | HEIGHT: 71 IN | OXYGEN SATURATION: 94 % | HEART RATE: 71 BPM

## 2022-03-31 DIAGNOSIS — M77.8 TENDONITIS OF BOTH WRISTS: ICD-10-CM

## 2022-03-31 PROCEDURE — 99203 OFFICE O/P NEW LOW 30 MIN: CPT | Performed by: PREVENTIVE MEDICINE

## 2022-03-31 RX ORDER — NAPROXEN 500 MG/1
500 TABLET ORAL 2 TIMES DAILY WITH MEALS
Qty: 60 TABLET | Refills: 1 | Status: SHIPPED | OUTPATIENT
Start: 2022-03-31

## 2022-03-31 ASSESSMENT — FIBROSIS 4 INDEX: FIB4 SCORE: 0.78

## 2022-03-31 NOTE — PROGRESS NOTES
"Subjective:     Hoda Hope is a 37 y.o. 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  She states that over a few months she had gradual onset of bilateral wrist and hand pain.  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  This area required more forceful gripping grasping with the fingers.  She states within a few weeks she developed wrist pain on the ulnar side, worse on the left.  She states that symptoms progressed and she eventually went to the onsite and was shown some massage and stretches she could do which did not seem to help somewhat.  However symptoms did seem to progress since she started using wrist braces which again helped somewhat but symptoms continue to progress which then caused her to come to the clinic.  She denies any radiating pain, numbness or tingling.  Pain is on the ulnar aspect of both wrists.  She does have a history of left thumb tendinitis for which she had surgery.  Not taking medications currently for.    ROS: All systems were reviewed on intake form, form was reviewed and signed. See scanned documents in media. Pertinent positives and negatives included in HPI.    PMH: No pertinent past medical history to this problem  MEDS: Medications were reviewed in Epic  ALLERGIES:   Allergies   Allergen Reactions   • Amoxicillin Hives   • Iodine Hives     SOCHX: Works as a  at [employer]  FH: No pertinent family history to this problem       Objective:     /72   Pulse 71   Temp 36.9 °C (98.4 °F) (Temporal)   Ht 1.803 m (5' 11\")   Wt 81.7 kg (180 lb 1.6 oz)   SpO2 94%   BMI 25.12 kg/m²     Constitutional: Patient is in no acute distress. Appears well-developed and well-nourished.   HENT: Normocephalic and atraumatic. EOM are normal. No scleral " icterus.   Cardiovascular: Normal rate.    Pulmonary/Chest: Effort normal. No respiratory distress.   Neurological: Patient is alert and oriented to person, place, and time.   Skin: Skin is warm and dry.   Psychiatric: Normal mood and affect. Behavior is normal.     Wrist: No gross deformity.  Tenderness over the ulnar wrist.  Full range of motion.  Strength intact with some mild discomfort with strength testing.  Tinel's negative.  Phalen's negative.  Finkelstein's negative.    Assessment/Plan:       1. Tendonitis of both wrists  - naproxen (NAPROSYN) 500 MG Tab; Take 1 Tablet by mouth 2 times a day with meals.  Dispense: 60 Tablet; Refill: 1  - Referral to Hand Therapy    Released to Restricted Duty FROM 3/31/2022 TO 4/21/2022  Limit forceful gripping, grasping and pinching to less than 4 hours per shift.  Symptoms most consistent tendinitis of the wrist  Referral to hand therapy  Prescribed naproxen 500 mg twice daily  Okay to continue wrist braces  Okay to use heat/ice as needed  Okay to use muscle creams/ointments as needed  Restricted duty  Follow-  up 3 weeks    Differential diagnosis, natural history, supportive care, and indications for immediate follow-up discussed.    Approximately 35 minutes were spent in reviewing notes, preparing for visit, obtaining history, exam and evaluation, patient counseling/education and post visit documentation/orders.

## 2022-03-31 NOTE — LETTER
"EMPLOYEE’S CLAIM FOR COMPENSATION/ REPORT OF INITIAL TREATMENT  FORM C-4    EMPLOYEE’S CLAIM - PROVIDE ALL INFORMATION REQUESTED   First Name  Hoda Last Name  Jayy Birthdate                    1985                Sex  female Claim Number (Insurer’s Use Only)    Home Address  [address] Age  37 y.o. Height  1.803 m (5' 11\") Weight  81.7 kg (180 lb 1.6 oz) [facility]     [facility] Zip  [address] Telephone  [phone] (home)    Mailing Address  [address] [facility] Zip  [address] Primary Language Spoken  English    Insurer  Unknown Third-Party   [employer]   Employee's Occupation (Job Title) When Injury or Occupational Disease Occurred      Employer's Name/Company Name  [employer]  Telephone  [phone]    Office Mail Address (Number and Street)   [address] [place] [address]  Zip  [address]    Date of Injury  3/7/2022               Hours Injury   Date Employer Notified  3/7/2022 Last Day of Work after Injury     or Occupational Disease   Supervisor to Whom Injury     Reported  JEANETTE   Address or Location of Accident (if applicable)  [[place]]   What were you doing at the time of accident? (if applicable)  PULLING CAPS    How did this injury or occupational disease occur? (Be specific an answer in detail. Use additional sheet if necessary)  REPETITIVE MOTION OVER A LONG PERIOD OF TIME   If you believe that you have an occupational disease, when did you first have knowledge of the disability and it relationship to your employment?  N/A Witnesses to the Accident  N/A      Nature of Injury or Occupational Disease  Sprain  Part(s) of Body Injured or Affected  Wrist (L) and Hand (L), Wrist (R) and Hand (R),     I certify that the above is true and correct to the best of my knowledge and that I have provided this information in order to obtain the " benefits of [place]  I hereby authorize any physician, chiropractor, surgeon, practitioner, or other person, any hospital, including [facility] or [facility], any medical service organization, any insurance company, or other institution or organization to release to each other, any medical or other information, including benefits paid or payable, pertinent to this injury or disease, except information relative to diagnosis, treatment and/or counseling for AIDS, psychological conditions, alcohol or controlled substances, for which I must give specific authorization.  A Photostat of this authorization shall be as valid as the original.     Date: 03/31/22   Place: [facility]  Employee’s Original or  *Electronic Signature   THIS REPORT MUST BE COMPLETED AND MAILED WITHIN 3 WORKING DAYS OF TREATMENT   Place  [facility]  Name of Facility  [facility]   Date  3/31/2022 Diagnosis and Description of Injury or Occupational Disease  (M77.8) Tendonitis of both wrists Is there evidence the injured employee was under the influence of alcohol and/or another controlled substance at the time of accident?  ? No ? Yes (if yes, please explain)    Hour  1:42 PM   The encounter diagnosis was Tendonitis of both wrists. No   Treatment  Naproxen/hand therapy  Have you advised the patient to remain off work five days or     more?    X-Ray Findings      ? Yes Indicate dates:   From   To      From information given by the employee, together with medical evidence, can        you directly connect this injury or occupational disease as job incurred?  Yes ? No If no, is the injured employee capable of:  ? full duty  No ? modified duty  Yes   Is additional medical care by a physician indicated?  Yes If Modified Duty, Specify any Limitations / Restrictions  Limit forceful gripping/grasping to less than 4 hours  "per shift.  10 pound weight limit.   Do you know of any previous injury or disease contributing to this condition or occupational disease?  ? Yes ? No (Explain if yes)                          No  Comments:Does have history of left thumb tendinitis but pain from this injury is on the ulnar aspect of the wrist   Date  3/31/2022 Print Health Care Provider's   Perfecto Florence D.O. I certify the employer’s copy of  this form was mailed on:   Address  [address] [place] [address],   [address] Insurer’s Use Only     [facility] Zip  [address]    Provider’s Tax ID Number  [id number] Telephone  Dept: [phone]             Health Care Provider’s Original or Electronic Signature  e-SignTAYLOR, PERFECTO RIVAS D.O. Degree (MD,DO, DC,PAAmarilisC,APRN)   MD      * Complete and attach Release of Information (Form C-4A) when injured employee signs C-4 Form electronically  ORIGINAL - TREATING HEALTHCARE PROVIDER PAGE 2 - INSURER/TPA PAGE 3 - EMPLOYER PAGE 4 - EMPLOYEE             Form C-4 (rev.08/21)           BRIEF DESCRIPTION OF RIGHTS AND BENEFITS  (Pursuant to [place] 616C.[place])    Notice of Injury or Occupational Disease (Incident Report Form C-1): If an injury or occupational disease (OD) arises out of and in the course of employment, you must provide written notice to your employer as soon as practicable, but no later than 7 days after the accident or OD. Your employer shall maintain a sufficient supply of the required forms.    Claim for Compensation (Form C-4): If medical treatment is sought, the form C-4 is available at the place of initial treatment. A completed \"Claim for Compensation\" (Form C-4) must be filed within 90 days after an accident or OD. The treating physician or chiropractor must, within 3 working days after treatment, complete and mail to the employer, the employer's insurer and third-party , the Claim for Compensation.    Medical Treatment: If you require medical treatment for your on-the-job injury or OD, you may be " required to select a physician or chiropractor from a list provided by your workers’ compensation insurer, if it has contracted with an Organization for Managed Care (MCO) or Preferred Provider Organization (PPO) or providers of health care. If your employer has not entered into a contract with an MCO or PPO, you may select a physician or chiropractor from the Panel of Physicians and Chiropractors. Any medical costs related to your industrial injury or OD will be paid by your insurer.    Temporary Total Disability (TTD): If your doctor has certified that you are unable to work for a period of at least 5 consecutive days, or 5 cumulative days in a 20-day period, or places restrictions on you that your employer does not accommodate, you may be entitled to TTD compensation.    Temporary Partial Disability (TPD): If the wage you receive upon reemployment is less than the compensation for TTD to which you are entitled, the insurer may be required to pay you TPD compensation to make up the difference. TPD can only be paid for a maximum of 24 months.    Permanent Partial Disability (PPD): When your medical condition is stable and there is an indication of a PPD as a result of your injury or OD, within 30 days, your insurer must arrange for an evaluation by a rating physician or chiropractor to determine the degree of your PPD. The amount of your PPD award depends on the date of injury, the results of the PPD evaluation, your age and wage.    Permanent Total Disability (PTD): If you are medically certified by a treating physician or chiropractor as permanently and totally disabled and have been granted a PTD status by your insurer, you are entitled to receive monthly benefits not to exceed 66 2/3% of your average monthly wage. The amount of your PTD payments is subject to reduction if you previously received a lump-sum PPD award.    Vocational Rehabilitation Services: You may be eligible for vocational rehabilitation  services if you are unable to return to the job due to a permanent physical impairment or permanent restrictions as a result of your injury or occupational disease.    Transportation and Per Juan Reimbursement: You may be eligible for travel expenses and per juan associated with medical treatment.    Reopening: You may be able to reopen your claim if your condition worsens after claim closure.     Appeal Process: If you disagree with a written determination issued by the insurer or the insurer does not respond to your request, you may appeal to the Department of Administration, , by following the instructions contained in your determination letter. You must appeal the determination within 70 days from the date of the determination letter at [address], [address], [place], [place] [address], or [address] [facility] [address], [place], [place] [address]. If you disagree with the  decision, you may appeal to the Department of Administration, . You must file your appeal within 30 days from the date of the  decision letter at [address], [address], [place], [place] [address], or [address], [facility] [address], [place] [address]. If you disagree with a decision of an , you may file a petition for judicial review with the District Court. You must do so within 30 days of the Appeal Officer’s decision. You may be represented by an  at your own expense or you may contact the [facility] for possible representation.    [place]  for Injured Workers (NAIW): If you disagree with a  decision, you may request that NAIW represent you without charge at an  Hearing. For information regarding denial of benefits, you may contact the [facility] at: [address], [address], [place] [address], [phone], or [address] [facility] [address], [place] [address], [phone]    To File a  Complaint with the Division: If you wish to file a complaint with the  of the Division of Industrial Relations (DIR),  please contact the Workers’ Compensation Section, [address], [address], [place], [place] [address], telephone [phone]) [phone], or [address], [address], [place], [place] [address], telephone [phone].    For assistance with Workers’ Compensation Issues: You may contact the [facility] Office for Consumer Health Assistance, [address], [address], [place], [place] [address], Toll Free 1-288.377.1349, Web site: http://[facility].[place].gov/Programs/GHULAM E-mail: ghulam@[facility].[place].gov              __________________________________________________________________                                    __03/31/22____            Employee Name / Signature                                                                                                                            Date                                                                                                                                                                                                                              D-2 (rev. 10/20)

## 2022-03-31 NOTE — LETTER
[address],   [address] [place] [address]  Phone:  [phone]- Fax:  [phone]   Occupational Health [facility] Progress Report and Disability Certification  Date of Service: 3/31/2022   No Show:  No  Date / Time of Next Visit: 4/21/2022 @ 1:15 PM    Claim Information   Patient Name: Hoda Hope  Claim Number:     Employer: [employer]  Date of Injury: 3/7/2022     Insurer / TPA: [employer]  ID / SSN:     Occupation:   Diagnosis: The encounter diagnosis was Tendonitis of both wrists.    Medical Information   Related to Industrial Injury? 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  She states that over a few months she had gradual onset of bilateral wrist and hand pain.  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  This area required more forceful gripping grasping with the fingers.  She states within a few weeks she developed wrist pain on the ulnar side, worse on the left.  She states that symptoms progressed and she eventually went to the onsite and was shown some massage and stretches she could do which did not seem to help somewhat.  However symptoms did seem to progress since she started using wrist braces which again helped somewhat but symptoms continue to progress which then caused her to come to the clinic.  She denies any radiating pain, numbness or tingling.  Pain is on the ulnar aspect of both wrists.  She does have a history of left thumb tendinitis for which she had surgery.  Not taking medications currently for.   Objective Findings: Wrist: No gross deformity.  Tenderness over the ulnar wrist.  Full range of motion.  Strength intact with some mild discomfort with strength testing.  Tinel's negative.  Phalen's negative.  Finkelstein's negative.    Pre-Existing Condition(s):     Assessment:   Initial Visit    Status: Additional Care Required  Permanent Disability:No    Plan:      Diagnostics:      Comments:  Symptoms most consistent tendinitis of the wrist  Referral to hand therapy  Prescribed naproxen 500 mg twice daily  Okay to continue wrist braces  Okay to use heat/ice as needed  Okay to use muscle creams/ointments as needed  Restricted duty  Follow-  up 3 weeks    Disability Information   Status: Released to Restricted Duty    From:  3/31/2022  Through: 4/21/2022 Restrictions are: Temporary   Physical Restrictions   Sitting:    Standing:    Stooping:    Bending:      Squatting:    Walking:    Climbing:    Pushing:      Pulling:    Other:    Reaching Above Shoulder (L):   Reaching Above Shoulder (R):       Reaching Below Shoulder (L):    Reaching Below Shoulder (R):      Not to exceed Weight Limits   Carrying(hrs):   Weight Limit(lb): < or = to 10 pounds Lifting(hrs):   Weight  Limit(lb): < or = to 10 pounds   Comments: Limit forceful gripping, grasping and pinching to less than 4 hours per shift.    Repetitive Actions   Hands: i.e. Fine Manipulations from Grasping: < or = to 4 hrs/day   Feet: i.e. Operating Foot Controls:     Driving / Operate Machinery:     Health Care Provider’s Original or Electronic Signature  Perfecto Florence D.O. Health Care Provider’s Original or Electronic Signature    Tereso Leonardo MD         Clinic Name / Location: [address],   [address]  [place] [address] Clinic Phone Number: Dept: [phone]   Appointment Time: 1:45 Pm Visit Start Time: 1:42 PM   Check-In Time:  1:41 Pm Visit Discharge Time:  2:10 PM    Original-Treating Physician or Chiropractor    Page 2-Insurer/TPA    Page 3-Employer    Page 4-Employee

## 2022-04-21 ENCOUNTER — OCCUPATIONAL MEDICINE (OUTPATIENT)
Dept: OCCUPATIONAL MEDICINE | Facility: CLINIC | Age: 37
End: 2022-04-21
Payer: COMMERCIAL

## 2022-04-21 VITALS
BODY MASS INDEX: 25.2 KG/M2 | HEART RATE: 78 BPM | WEIGHT: 180 LBS | HEIGHT: 71 IN | DIASTOLIC BLOOD PRESSURE: 68 MMHG | SYSTOLIC BLOOD PRESSURE: 120 MMHG | TEMPERATURE: 97.6 F | OXYGEN SATURATION: 100 %

## 2022-04-21 DIAGNOSIS — M77.8 TENDONITIS OF BOTH WRISTS: ICD-10-CM

## 2022-04-21 PROCEDURE — 99213 OFFICE O/P EST LOW 20 MIN: CPT | Performed by: PREVENTIVE MEDICINE

## 2022-04-21 ASSESSMENT — FIBROSIS 4 INDEX: FIB4 SCORE: 0.78

## 2022-04-21 NOTE — PROGRESS NOTES
"Subjective:     Hoda Hope is a 37 y.o. 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  She states that over a few months she had gradual onset of bilateral wrist and hand pain.  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  Patient states that overall symptoms about the same.  Continues to get pain over the wrist bilaterally.  Mostly on the ulnar side.  She does get some numbness and tingling over her fingers.    ROS         Objective:     /68   Pulse 78   Temp 36.4 °C (97.6 °F)   Ht 1.803 m (5' 11\")   Wt 81.6 kg (180 lb)   SpO2 100%   BMI 25.10 kg/m²     Constitutional: Patient is in no acute distress. Appears well-developed and well-nourished.   Cardiovascular: Normal rate.    Pulmonary/Chest: Effort normal. No respiratory distress.   Neurological: Patient is alert and oriented to person, place, and time.   Skin: Skin is warm and dry.   Psychiatric: Normal mood and affect. Behavior is normal.     Wrist: No gross deformity.  Area of tenderness over the ulnar wrist.  Full range of motion.      Assessment/Plan:       1. Tendonitis of both wrists  - Referral to Neurodiagnostics (EEG,EP,EMG/NCS/DBS)    Released to Restricted Duty FROM 4/21/2022 TO 5/26/2022  Limit forceful gripping, grasping and pinching to less than 4 hours per shift.    Given increase of numbness and tingling will order EMG  Referral to hand therapy, pending approval  Continue naproxen twice daily  Okay to continue wrist braces  Okay to use heat/ice as needed  Okay to use muscle creams/ointments as needed  Restricte  d duty  Follow-up 1 month     Differential diagnosis, natural history, supportive care, and indications for immediate follow-up discussed.    Approximately 25 minutes was spent in preparing for visit, " obtaining history, exam and evaluation, patient counseling/education and post visit documentation/orders.

## 2022-04-21 NOTE — LETTER
[address],   [address] [place] [address]  Phone:  [phone] - Fax:  [phone]   Occupational Health [facility] Progress Report and Disability Certification  Date of Service: 4/21/2022   No Show:  No  Date / Time of Next Visit: 5/26/2022 @ 1:30 PM    Claim Information   Patient Name: Hoda Hope  Claim Number:     Employer: [employer]  Date of Injury: 3/7/2022     Insurer / TPA: [employer]  ID / SSN:     Occupation:   Diagnosis: The encounter diagnosis was Tendonitis of both wrists.    Medical Information   Related to Industrial Injury? 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  She states that over a few months she had gradual onset of bilateral wrist and hand pain.  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  Patient states that overall symptoms about the same.  Continues to get pain over the wrist bilaterally.  Mostly on the ulnar side.  She does get some numbness and tingling over her fingers.   Objective Findings: Wrist: No gross deformity.  Area of tenderness over the ulnar wrist.  Full range of motion.     Pre-Existing Condition(s):     Assessment:   Condition Same    Status: Additional Care Required  Permanent Disability:No    Plan:      Diagnostics:      Comments:  Given increase of numbness and tingling will order EMG  Referral to hand therapy, pending approval  Continue naproxen twice daily  Okay to continue wrist braces  Okay to use heat/ice as needed  Okay to use muscle creams/ointments as needed  Restricte  d duty  Follow-up 1 month     Disability Information   Status: Released to Restricted Duty    From:  4/21/2022  Through: 5/26/2022 Restrictions are: Temporary   Physical Restrictions   Sitting:    Standing:    Stooping:    Bending:       Squatting:    Walking:    Climbing:    Pushing:      Pulling:    Other:    Reaching Above Shoulder (L):   Reaching Above Shoulder (R):       Reaching Below Shoulder (L):    Reaching Below Shoulder (R):      Not to exceed Weight Limits   Carrying(hrs):   Weight Limit(lb): < or = to 10 pounds Lifting(hrs):   Weight  Limit(lb): < or = to 10 pounds   Comments: Limit forceful gripping, grasping and pinching to less than 4 hours per shift.    Repetitive Actions   Hands: i.e. Fine Manipulations from Grasping:     Feet: i.e. Operating Foot Controls:     Driving / Operate Machinery:     Health Care Provider’s Original or Electronic Signature  Perfecto Florence D.O. Health Care Provider’s Original or Electronic Signature    Tereso Leonardo MD         Clinic Name / Location: [address],   [address] [address] Clinic Phone Number: Dept: [phone]   Appointment Time: 1:15 Pm Visit Start Time: 1:06 PM   Check-In Time:  1:05 Pm Visit Discharge Time:  1:40 PM    Original-Treating Physician or Chiropractor    Page 2-Insurer/TPA    Page 3-Employer    Page 4-Employee

## 2022-05-06 ENCOUNTER — OFFICE VISIT (OUTPATIENT)
Dept: MEDICAL GROUP | Facility: CLINIC | Age: 37
End: 2022-05-06

## 2022-05-06 VITALS
SYSTOLIC BLOOD PRESSURE: 118 MMHG | HEIGHT: 69 IN | HEART RATE: 74 BPM | OXYGEN SATURATION: 96 % | RESPIRATION RATE: 20 BRPM | TEMPERATURE: 97.8 F | DIASTOLIC BLOOD PRESSURE: 80 MMHG | WEIGHT: 177 LBS | BODY MASS INDEX: 26.22 KG/M2

## 2022-05-06 DIAGNOSIS — J30.2 SEASONAL ALLERGIES: ICD-10-CM

## 2022-05-06 DIAGNOSIS — J34.89 NASAL CONGESTION WITH RHINORRHEA: ICD-10-CM

## 2022-05-06 DIAGNOSIS — R09.81 NASAL CONGESTION WITH RHINORRHEA: ICD-10-CM

## 2022-05-06 PROCEDURE — 99214 OFFICE O/P EST MOD 30 MIN: CPT | Performed by: PHYSICIAN ASSISTANT

## 2022-05-06 RX ORDER — CETIRIZINE HYDROCHLORIDE 10 MG/1
10 TABLET ORAL DAILY
Qty: 30 TABLET | Refills: 2 | Status: SHIPPED | OUTPATIENT
Start: 2022-05-06

## 2022-05-06 RX ORDER — FLUTICASONE PROPIONATE 50 MCG
1 SPRAY, SUSPENSION (ML) NASAL DAILY
Qty: 16 G | Refills: 1 | Status: SHIPPED | OUTPATIENT
Start: 2022-05-06

## 2022-05-06 ASSESSMENT — FIBROSIS 4 INDEX: FIB4 SCORE: 0.78

## 2022-05-25 ASSESSMENT — ENCOUNTER SYMPTOMS
GASTROINTESTINAL NEGATIVE: 1
EYES NEGATIVE: 1
SPUTUM PRODUCTION: 0
NEUROLOGICAL NEGATIVE: 1
SINUS PAIN: 1
PSYCHIATRIC NEGATIVE: 1
SHORTNESS OF BREATH: 0
SORE THROAT: 1
CONSTITUTIONAL NEGATIVE: 1
MUSCULOSKELETAL NEGATIVE: 1
WHEEZING: 0
CARDIOVASCULAR NEGATIVE: 1
COUGH: 1
HEMOPTYSIS: 0
STRIDOR: 0

## 2022-05-25 ASSESSMENT — VISUAL ACUITY: OU: 1

## 2022-05-25 NOTE — PROGRESS NOTES
Subjective   Hoda Hope is a 37 y.o. female who presents with Sinus Problem (Runny nose and drainage in the back of throat )    1. Seasonal allergies  Patient presents today with worsening seasonal allergies. Itchy eyes, runny nose, sinus congestion and post nasal drip. Has been taking OTC cold medication for it without relief. Will have her discontinue cold medication and start Flonase and Zyrtec daily. Discussed the use of nasal saline multiple times a day also.   cetirizine (ZYRTEC) 10 MG Tab; Take 1 Tablet by mouth every day.  Dispense: 30 Tablet; Refill: 2   fluticasone (FLONASE) 50 MCG/ACT nasal spray; Administer 1 Spray into affected nostril(S) every day.  Dispense: 16 g; Refill: 1    2. Nasal congestion with rhinorrhea  Nasal congestion with runny nose is causing headaches. Use saline rinse, then flonase every morning. Use saline rinse in the evening before bed and anytime during the day when nasal symptoms are bothersome.   cetirizine (ZYRTEC) 10 MG Tab; Take 1 Tablet by mouth every day.  Dispense: 30 Tablet; Refill: 2   fluticasone (FLONASE) 50 MCG/ACT nasal spray; Administer 1 Spray into affected nostril(S) every day.  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  No date: Anxiety  No date: Depression  No date: Migraine  Past Surgical History:  2009: TENOLYSIS TENDON OF THE HAND; Left      Comment:  thumb tendon release  2006: HAND SURGERY; Left      Comment:  cyst removal  Social History    Tobacco Use      Smoking status: Current Every Day Smoker        Packs/day: 0.50        Years: 25.00        Pack years: 12.5        Types: Cigarettes        Start date: 3/10/1995      Smokeless tobacco: Never Used    Vaping Use      Vaping Use: Never used    Alcohol use: No    Drug use: Yes      Frequency: 21.0 times per week      Types: Inhaled, Marijuana      Comment: 1.75g/week     Review of patient's  family history indicates:  Problem: Kidney Disease      Relation: Mother          Age of Onset: (Not Specified)  Problem: Psychiatric Illness      Relation: Mother          Age of Onset: (Not Specified)  Problem: Heart Disease      Relation: Father          Age of Onset: (Not Specified)  Problem: Kidney Disease      Relation: Father          Age of Onset: (Not Specified)  Problem: Diabetes      Relation: Father          Age of Onset: (Not Specified)  Problem: Hypertension      Relation: Father          Age of Onset: (Not Specified)  Problem: Other      Relation: Father          Age of Onset: (Not Specified)          Comment: motor neuropath  Problem: Heart Disease      Relation: Sister          Age of Onset: (Not Specified)  Problem: Depression      Relation: Sister          Age of Onset: (Not Specified)  Problem: GI Disease      Relation: Sister          Age of Onset: (Not Specified)  Problem: No Known Problems      Relation: Brother          Age of Onset: (Not Specified)  Problem: No Known Problems      Relation: Child          Age of Onset: (Not Specified)  Problem: Dementia      Relation: Maternal Grandmother          Age of Onset: (Not Specified)  Problem: Breast Cancer      Relation: Paternal Grandmother          Age of Onset: (Not Specified)  Problem: Cancer      Relation: Paternal Grandmother          Age of Onset: (Not Specified)  Problem: Lung Cancer      Relation: Paternal Grandfather          Age of Onset: (Not Specified)          Comment: possible asbestos  Problem: Diabetes      Relation: Paternal Grandfather          Age of Onset: (Not Specified)      Current Outpatient Medications: •  cetirizine (ZYRTEC) 10 MG Tab, Take 1 Tablet by mouth every day., Disp: 30 Tablet, Rfl: 2•  fluticasone (FLONASE) 50 MCG/ACT nasal spray, Administer 1 Spray into affected nostril(S) every day., Disp: 16 g, Rfl: 1•  naproxen (NAPROSYN) 500 MG Tab, Take 1 Tablet by mouth 2 times a day with meals., Disp: 60 Tablet, Rfl:  "1    Patient was instructed on the use of medications, either prescriptions or OTC and informed on when the appropriate follow up time period should be. In addition, patient was also instructed that should any acute worsening occur that they should notify this clinic asap or call 911.      Review of Systems   Constitutional: Negative.    HENT: Positive for congestion, sinus pain and sore throat. Negative for ear discharge, ear pain, hearing loss, nosebleeds and tinnitus.    Eyes: Negative.    Respiratory: Positive for cough. Negative for hemoptysis, sputum production, shortness of breath, wheezing and stridor.    Cardiovascular: Negative.    Gastrointestinal: Negative.    Genitourinary: Negative.    Musculoskeletal: Negative.    Skin: Negative.    Neurological: Negative.    Endo/Heme/Allergies: Negative.    Psychiatric/Behavioral: Negative.      Objective     /80 (BP Location: Left arm, Patient Position: Sitting, BP Cuff Size: Adult)   Pulse 74   Temp 36.6 °C (97.8 °F) (Temporal)   Resp 20   Ht 1.753 m (5' 9\")   Wt 80.3 kg (177 lb)   SpO2 96%   BMI 26.14 kg/m²      Physical Exam  Vitals and nursing note reviewed.   Constitutional:       General: She is not in acute distress.     Appearance: Normal appearance. She is well-developed, well-groomed and overweight. She is not ill-appearing.   HENT:      Head: Normocephalic and atraumatic.      Nose: Congestion and rhinorrhea present. Rhinorrhea is clear.      Right Turbinates: Swollen and pale.      Left Turbinates: Swollen and pale.      Right Sinus: Maxillary sinus tenderness present. No frontal sinus tenderness.      Left Sinus: Maxillary sinus tenderness present. No frontal sinus tenderness.      Mouth/Throat:      Lips: Pink. No lesions.      Mouth: Mucous membranes are moist.      Pharynx: Oropharynx is clear. Uvula midline. Posterior oropharyngeal erythema present. No pharyngeal swelling, oropharyngeal exudate or uvula swelling.      Tonsils: No " tonsillar exudate or tonsillar abscesses.      Comments: Post nasal drip with cobble stoning  Eyes:      General: Lids are normal. Vision grossly intact. Gaze aligned appropriately.      Extraocular Movements: Extraocular movements intact.      Conjunctiva/sclera: Conjunctivae normal.      Pupils: Pupils are equal, round, and reactive to light.   Neck:      Thyroid: No thyromegaly.      Vascular: No carotid bruit or JVD.      Trachea: Trachea and phonation normal.   Cardiovascular:      Rate and Rhythm: Normal rate and regular rhythm.      Heart sounds: Normal heart sounds. No murmur heard.    No friction rub. No gallop.   Pulmonary:      Effort: Pulmonary effort is normal.      Breath sounds: Normal breath sounds. No wheezing, rhonchi or rales.   Musculoskeletal:         General: Normal range of motion.      Cervical back: Normal range of motion and neck supple.      Right lower leg: No edema.      Left lower leg: No edema.   Lymphadenopathy:      Cervical: No cervical adenopathy.   Skin:     General: Skin is warm and dry.      Capillary Refill: Capillary refill takes less than 2 seconds.      Findings: No lesion or rash.   Neurological:      Mental Status: She is alert and oriented to person, place, and time.      Cranial Nerves: Cranial nerves are intact.   Psychiatric:         Attention and Perception: Attention and perception normal.         Mood and Affect: Mood and affect normal.         Speech: Speech normal.         Behavior: Behavior normal. Behavior is cooperative.         Thought Content: Thought content normal.         Judgment: Judgment normal.       Assessment & Plan      1. Seasonal allergies  - cetirizine (ZYRTEC) 10 MG Tab; Take 1 Tablet by mouth every day.  Dispense: 30 Tablet; Refill: 2  - fluticasone (FLONASE) 50 MCG/ACT nasal spray; Administer 1 Spray into affected nostril(S) every day.  Dispense: 16 g; Refill: 1    2. Nasal congestion with rhinorrhea  - cetirizine (ZYRTEC) 10 MG Tab; Take 1  Tablet by mouth every day.  Dispense: 30 Tablet; Refill: 2  - fluticasone (FLONASE) 50 MCG/ACT nasal spray; Administer 1 Spray into affected nostril(S) every day.  Dispense: 16 g; Refill: 1

## 2022-05-26 ENCOUNTER — OCCUPATIONAL MEDICINE (OUTPATIENT)
Dept: OCCUPATIONAL MEDICINE | Facility: CLINIC | Age: 37
End: 2022-05-26
Payer: COMMERCIAL

## 2022-05-26 VITALS
DIASTOLIC BLOOD PRESSURE: 68 MMHG | RESPIRATION RATE: 18 BRPM | WEIGHT: 185 LBS | SYSTOLIC BLOOD PRESSURE: 110 MMHG | BODY MASS INDEX: 27.4 KG/M2 | HEIGHT: 69 IN | HEART RATE: 78 BPM

## 2022-05-26 DIAGNOSIS — M77.8 TENDONITIS OF BOTH WRISTS: ICD-10-CM

## 2022-05-26 PROCEDURE — 99213 OFFICE O/P EST LOW 20 MIN: CPT | Performed by: PREVENTIVE MEDICINE

## 2022-05-26 ASSESSMENT — FIBROSIS 4 INDEX: FIB4 SCORE: 0.78

## 2022-05-26 NOTE — LETTER
[address],   [address] [place] [address]  Phone:  [phone] - Fax:  [phone]   Occupational Health [facility] Progress Report and Disability Certification  Date of Service: 5/26/2022   No Show:  No  Date / Time of Next Visit: 7/7/22 @ 1:15pm   Claim Information   Patient Name: Hoda Hope  Claim Number:     Employer: [employer]  Date of Injury: 3/7/2022     Insurer / TPA: [employer]  ID / SSN:     Occupation:   Diagnosis: The encounter diagnosis was Tendonitis of both wrists.    Medical Information   Related to Industrial Injury? 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  She states that over a few months she had gradual onset of bilateral wrist and hand pain.  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  Patient states overall symptoms about the same.  She has been doing hand therapy.  EMG scheduled for June 22.   Objective Findings: Wrist: No gross deformity.  Area of tenderness over the ulnar wrist bilaterally.  Full range of motion discomfort.     Pre-Existing Condition(s):     Assessment:   Condition Same    Status: Additional Care Required  Permanent Disability:No    Plan:      Diagnostics:      Comments:  Keep appointment for EMG  Hand therapy  Continue naproxen twice daily  Okay to continue wrist braces  Okay to use heat/ice as needed  Okay to use muscle creams/ointments as needed  Restricted duty  Follow-up 1 month        Disability Information   Status: Released to Restricted Duty    From:  5/26/2022  Through: 7/5/2022 Restrictions are: Temporary   Physical Restrictions   Sitting:    Standing:    Stooping:    Bending:      Squatting:    Walking:    Climbing:    Pushing:      Pulling:    Other:    Reaching Above Shoulder (L):   Reaching Above  Shoulder (R):       Reaching Below Shoulder (L):    Reaching Below Shoulder (R):      Not to exceed Weight Limits   Carrying(hrs):   Weight Limit(lb): < or = to 10 pounds Lifting(hrs):   Weight  Limit(lb): < or = to 10 pounds   Comments: Limit forceful gripping, grasping and pinching to less than 4 hours per shift.     Repetitive Actions   Hands: i.e. Fine Manipulations from Grasping:     Feet: i.e. Operating Foot Controls:     Driving / Operate Machinery:     Health Care Provider’s Original or Electronic Signature  Perfecto Florence D.O. Health Care Provider’s Original or Electronic Signature    Tereso Leonardo MD         Clinic Name / Location: [address],   [address] [place] [address] Clinic Phone Number: Dept: [phone]   Appointment Time: 1:30 Pm Visit Start Time: 1:17 PM   Check-In Time:  1:00 Pm Visit Discharge Time:  1:55pm   Original-Treating Physician or Chiropractor    Page 2-Insurer/TPA    Page 3-Employer    Page 4-Employee

## 2022-05-26 NOTE — PROGRESS NOTES
"Subjective:     Hoda Hope is a 37 y.o. 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  She states that over a few months she had gradual onset of bilateral wrist and hand pain.  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  Patient states overall symptoms about the same.  She has been doing hand therapy.  EMG scheduled for June 22.    ROS         Objective:     /68   Pulse 78   Resp 18   Ht 1.753 m (5' 9\")   Wt 83.9 kg (185 lb)   BMI 27.32 kg/m²     Constitutional: Patient is in no acute distress. Appears well-developed and well-nourished.   Cardiovascular: Normal rate.    Pulmonary/Chest: Effort normal. No respiratory distress.   Neurological: Patient is alert and oriented to person, place, and time.   Skin: Skin is warm and dry.   Psychiatric: Normal mood and affect. Behavior is normal.     Wrist: No gross deformity.  Area of tenderness over the ulnar wrist bilaterally.  Full range of motion discomfort.      Assessment/Plan:       1. Tendonitis of both wrists    Released to Restricted Duty FROM 5/26/2022 TO 7/5/2022  Limit forceful gripping, grasping and pinching to less than 4 hours per shift.     Keep appointment for EMG  Hand therapy  Continue naproxen twice daily  Okay to continue wrist braces  Okay to use heat/ice as needed  Okay to use muscle creams/ointments as needed  Restricted duty  Follow-up 1 month        Differential diagnosis, natural history, supportive care, and indications for immediate follow-up discussed.    Approximately 25 minutes was spent in preparing for visit, obtaining history, exam and evaluation, patient counseling/education and post visit documentation/orders.  "

## 2022-06-03 NOTE — ED PROVIDER NOTES
ED Provider Note    ED Provider Note    Primary care provider: Tiffany Reid P.A.-C.  Means of arrival: POV  History obtained from: patient  History limited by: None    CHIEF COMPLAINT  Chief Complaint   Patient presents with   • Allergic Reaction     large swollen areas to arms, L ahnd and neck from possible bug bite       HPI  Hoda Hope is a 36 y.o. female who presents to the Emergency Department accompanied by her partner with a chief complaint of multiple insect bites that are red, itchy and raised, slightly swollen.  This started about 4 days ago and the lesions have come and gone.  Originally, presented on the left forearm, those are mostly resolved, now presenting on the back of the left hand and a few on the right forearm.  They are limited to the upper extremities.  No lesions on her trunk or legs.  She denies cough or cold symptoms.  No tongue swelling no wheezing.  No difficulty breathing.  No chest pain.    REVIEW OF SYSTEMS  Review of Systems   Constitutional: Negative for fever.   HENT: Negative for congestion.    Respiratory: Negative for cough.    Cardiovascular: Negative for chest pain.   Gastrointestinal: Negative for vomiting.   Skin: Positive for itching and rash.   Neurological: Negative for headaches.   All other systems reviewed and are negative.      PAST MEDICAL HISTORY   has a past medical history of Allergy, Antibiotic-associated diarrhea (9/5/2018), Anxiety, Depression, and Migraine.    SURGICAL HISTORY   has a past surgical history that includes hand surgery (Left, 2006) and tenolysis tendon of the hand (Left, 2009).    SOCIAL HISTORY  Social History     Tobacco Use   • Smoking status: Current Every Day Smoker     Packs/day: 1.00     Years: 25.00     Pack years: 25.00     Types: Cigarettes     Start date: 3/10/1995   • Smokeless tobacco: Never Used   Vaping Use   • Vaping Use: Never used   Substance Use Topics   • Alcohol use: No     Comment: occasionally   • Drug use:  See note 6/2/22.   "Yes     Frequency: 21.0 times per week     Types: Inhaled, Marijuana     Comment: 1.75g/week       Social History     Substance and Sexual Activity   Drug Use Yes   • Frequency: 21.0 times per week   • Types: Inhaled, Marijuana    Comment: 1.75g/week        FAMILY HISTORY  Family History   Problem Relation Age of Onset   • Kidney Disease Mother    • Psychiatric Illness Mother    • Heart Disease Father    • Kidney Disease Father    • Diabetes Father    • Hypertension Father    • Other Father         motor neuropath   • Heart Disease Sister    • Depression Sister    • GI Disease Sister    • No Known Problems Brother    • No Known Problems Child    • Dementia Maternal Grandmother    • Breast Cancer Paternal Grandmother    • Cancer Paternal Grandmother    • Lung Cancer Paternal Grandfather         possible asbestos   • Diabetes Paternal Grandfather        CURRENT MEDICATIONS  Home Medications     Reviewed by Tamanna Puente R.N. (Registered Nurse) on 06/06/21 at 0931  Med List Status: Complete   Medication Last Dose Status        Patient Jerrod Taking any Medications                       ALLERGIES  Allergies   Allergen Reactions   • Amoxicillin Hives   • Iodine Hives       PHYSICAL EXAM  VITAL SIGNS: /70   Pulse 70   Temp 36.3 °C (97.3 °F) (Temporal)   Resp 16   Ht 1.753 m (5' 9\")   Wt 90.8 kg (200 lb 2.8 oz)   SpO2 95%   BMI 29.56 kg/m²   Vitals reviewed.  Constitutional: Patient is oriented to person, place, and time. Appears well-developed and well-nourished. No distress.    Head: Normocephalic and atraumatic  Mouth/Throat: Oropharynx is clear and moist  Eyes: Conjunctivae are normal.  Neck: Normal range of motion.  Daily  Cardiovascular: Normal rate, regular rhythm and normal heart sounds. Normal peripheral pulses, upper extremities  Pulmonary/Chest: Effort normal and breath sounds normal. No respiratory distress, no wheezes, rhonchi, or rales.  Musculoskeletal: No edema and no tenderness. " [Clinical] : TNM Stage: c   Neurological: No focal deficits.   Skin: Skin is warm and dry. No erythema. No pallor.   Psychiatric: Patient has a normal mood and affect.     COURSE & MEDICAL DECISION MAKING  Pertinent Labs & Imaging studies reviewed. (See chart for details)    Obtained and reviewed past medical records.  Patient's last encounter was an outpatient visit May 5 of this year patient was seen for numbness and tingling on her skin, follow-up as well as callused feet.    9:47 AM - Patient seen and examined at bedside.  This is a pleasant and overall well-appearing 36-year-old female who presents with red raised pruritic lesions.  These appear to be bug bites.  She has had a few that have since resolved.  She has no respiratory symptoms.  She will be treated with prednisone and Benadryl.  She has not taken any medications prior to arrival.    Patient was medicated and discharged home in stable condition.    FINAL IMPRESSION  1. Insect bite of multiple sites of upper arm with local reaction, initial encounter                      [II] : II [TTNM] : X [NTNM] : X [MTNM] : X

## 2022-07-07 ENCOUNTER — OCCUPATIONAL MEDICINE (OUTPATIENT)
Dept: OCCUPATIONAL MEDICINE | Facility: CLINIC | Age: 37
End: 2022-07-07
Payer: COMMERCIAL

## 2022-07-07 VITALS
HEART RATE: 61 BPM | OXYGEN SATURATION: 97 % | SYSTOLIC BLOOD PRESSURE: 124 MMHG | BODY MASS INDEX: 26.28 KG/M2 | WEIGHT: 183.6 LBS | TEMPERATURE: 98.6 F | RESPIRATION RATE: 16 BRPM | DIASTOLIC BLOOD PRESSURE: 68 MMHG | HEIGHT: 70 IN

## 2022-07-07 DIAGNOSIS — M77.8 TENDONITIS OF BOTH WRISTS: ICD-10-CM

## 2022-07-07 PROCEDURE — 99213 OFFICE O/P EST LOW 20 MIN: CPT | Performed by: PREVENTIVE MEDICINE

## 2022-07-07 ASSESSMENT — ENCOUNTER SYMPTOMS
FOCAL WEAKNESS: 0
TINGLING: 0
SENSORY CHANGE: 0

## 2022-07-07 ASSESSMENT — FIBROSIS 4 INDEX: FIB4 SCORE: 0.78

## 2022-07-07 NOTE — PROGRESS NOTES
"Subjective:     Hoda Hope is a 37 y.o. 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  She states that over a few months she had gradual onset of bilateral wrist and hand pain.  Patient states that overall symptoms are about the same.  She states she finished hand therapy without much improvement.  She had the EMG performed 2 weeks.  She was told it was negative.  Takes occasional naproxen MRIs not taking medications for this condition.  Pain seems to be worsened with any frequent use of the wrist.    Review of Systems   Neurological: Negative for tingling, sensory change and focal weakness.       SOCHX: Works as a  at [employer]  FH: No pertinent family history to this problem.       Objective:     /68 (BP Location: Left arm, Patient Position: Sitting, BP Cuff Size: Adult)   Pulse 61   Temp 37 °C (98.6 °F) (Temporal)   Resp 16   Ht 1.778 m (5' 10\")   Wt 83.3 kg (183 lb 9.6 oz)   SpO2 97%   BMI 26.34 kg/m²     Constitutional: Patient is in no acute distress. Appears well-developed and well-nourished.   Cardiovascular: Normal rate.    Pulmonary/Chest: Effort normal. No respiratory distress.   Neurological: Patient is alert and oriented to person, place, and time.   Skin: Skin is warm and dry.   Psychiatric: Normal mood and affect. Behavior is normal.     Wrist: No gross deformity.  Area of tenderness over the ulnar wrist bilaterally.  Full range of motion discomfort.      Assessment/Plan:       1. Tendonitis of both wrists  - Referral to Hand Surgery    Released to Restricted Duty FROM 7/7/2022 TO 8/9/2022  Limit forceful gripping, grasping and pinching to less than 4 hours per shift.      Given duration of symptoms refer to hand surgery  Continue naproxen twice daily as needed  Okay to continue wrist braces  Okay to use heat/ice as needed  Okay to use muscle " creams/ointments as needed  Restricted duty  Follow-up 1 month if not seen by hand surgery    Differential diagnosis, natural history, supportive care, and indications for immediate follow-up discussed.    Approximately 25 minutes was spent in preparing for visit, obtaining history, exam and evaluation, patient counseling/education and post visit documentation/orders.

## 2022-07-07 NOTE — LETTER
[address],   [address] [place] [address]  Phone:  [phone] - Fax:  [phone]   Occupational Health [facility] Progress Report and Disability Certification  Date of Service: 7/7/2022   No Show:  No  Date / Time of Next Visit: 8/18/22 @ 1pm   Claim Information   Patient Name: Hoda Hope  Claim Number:     Employer: [employer]  Date of Injury: 3/7/2022     Insurer / TPA: [employer]  ID / SSN:     Occupation:   Diagnosis: The encounter diagnosis was Tendonitis of both wrists.    Medical Information   Related to Industrial Injury? 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  She states that over a few months she had gradual onset of bilateral wrist and hand pain.  Patient states that overall symptoms are about the same.  She states she finished hand therapy without much improvement.  She had the EMG performed 2 weeks.  She was told it was negative.  Takes occasional naproxen MRIs not taking medications for this condition.  Pain seems to be worsened with any frequent use of the wrist.   Objective Findings: Wrist: No gross deformity.  Area of tenderness over the ulnar wrist bilaterally.  Full range of motion discomfort.     Pre-Existing Condition(s):     Assessment:   Condition Same    Status: Additional Care Required  Permanent Disability:No    Plan:      Diagnostics:      Comments:  Given duration of symptoms refer to hand surgery  Continue naproxen twice daily as needed  Okay to continue wrist braces  Okay to use heat/ice as needed  Okay to use muscle creams/ointments as needed  Restricted duty  Follow-up 1 month if not seen by hand surgery    Disability Information   Status: Released to Restricted Duty    From:  7/7/2022  Through: 8/19/22 Restrictions are: Temporary   Physical Restrictions   Sitting:    Standing:    Stooping:    Bending:       Squatting:    Walking:    Climbing:    Pushing:      Pulling:    Other:    Reaching Above Shoulder (L):   Reaching Above Shoulder (R):       Reaching Below Shoulder (L):    Reaching Below Shoulder (R):      Not to exceed Weight Limits   Carrying(hrs):   Weight Limit(lb): < or = to 10 pounds Lifting(hrs):   Weight  Limit(lb): < or = to 10 pounds   Comments: Limit forceful gripping, grasping and pinching to less than 4 hours per shift.      Repetitive Actions   Hands: i.e. Fine Manipulations from Grasping:     Feet: i.e. Operating Foot Controls:     Driving / Operate Machinery:     Health Care Provider’s Original or Electronic Signature  Perfecto Florence D.O. Health Care Provider’s Original or Electronic Signature    Tereso Leonardo MD         Clinic Name / Location: [address],   [address] [address] Clinic Phone Number: Dept: [phone]   Appointment Time: 1:15 Pm Visit Start Time: 1:10 PM   Check-In Time:  12:57 Pm Visit Discharge Time:  1:45pm   Original-Treating Physician or Chiropractor    Page 2-Insurer/TPA    Page 3-Employer    Page 4-Employee

## 2022-12-07 PROBLEM — M77.8 EXTENSOR CARPI ULNARIS TENDINITIS: Status: ACTIVE | Noted: 2022-12-07

## 2022-12-07 PROBLEM — G56.01 RIGHT CARPAL TUNNEL SYNDROME: Status: ACTIVE | Noted: 2022-12-07

## 2023-01-27 PROBLEM — G56.01 CARPAL TUNNEL SYNDROME, RIGHT: Status: ACTIVE | Noted: 2023-01-27

## 2023-04-13 PROBLEM — G56.02 CARPAL TUNNEL SYNDROME, LEFT: Status: ACTIVE | Noted: 2023-04-13

## 2023-10-20 ENCOUNTER — PATIENT MESSAGE (OUTPATIENT)
Dept: HEALTH INFORMATION MANAGEMENT | Facility: OTHER | Age: 38
End: 2023-10-20

## 2023-10-20 ENCOUNTER — DOCUMENTATION (OUTPATIENT)
Dept: HEALTH INFORMATION MANAGEMENT | Facility: OTHER | Age: 38
End: 2023-10-20
Payer: COMMERCIAL

## 2024-06-07 SDOH — ECONOMIC STABILITY: HOUSING INSECURITY
IN THE LAST 12 MONTHS, WAS THERE A TIME WHEN YOU DID NOT HAVE A STEADY PLACE TO SLEEP OR SLEPT IN A SHELTER (INCLUDING NOW)?: NO

## 2024-06-07 SDOH — ECONOMIC STABILITY: FOOD INSECURITY: WITHIN THE PAST 12 MONTHS, THE FOOD YOU BOUGHT JUST DIDN'T LAST AND YOU DIDN'T HAVE MONEY TO GET MORE.: SOMETIMES TRUE

## 2024-06-07 SDOH — ECONOMIC STABILITY: INCOME INSECURITY: HOW HARD IS IT FOR YOU TO PAY FOR THE VERY BASICS LIKE FOOD, HOUSING, MEDICAL CARE, AND HEATING?: VERY HARD

## 2024-06-07 SDOH — ECONOMIC STABILITY: TRANSPORTATION INSECURITY
IN THE PAST 12 MONTHS, HAS THE LACK OF TRANSPORTATION KEPT YOU FROM MEDICAL APPOINTMENTS OR FROM GETTING MEDICATIONS?: NO

## 2024-06-07 SDOH — HEALTH STABILITY: PHYSICAL HEALTH: ON AVERAGE, HOW MANY DAYS PER WEEK DO YOU ENGAGE IN MODERATE TO STRENUOUS EXERCISE (LIKE A BRISK WALK)?: 0 DAYS

## 2024-06-07 SDOH — ECONOMIC STABILITY: INCOME INSECURITY: IN THE LAST 12 MONTHS, WAS THERE A TIME WHEN YOU WERE NOT ABLE TO PAY THE MORTGAGE OR RENT ON TIME?: YES

## 2024-06-07 SDOH — ECONOMIC STABILITY: FOOD INSECURITY: WITHIN THE PAST 12 MONTHS, YOU WORRIED THAT YOUR FOOD WOULD RUN OUT BEFORE YOU GOT MONEY TO BUY MORE.: SOMETIMES TRUE

## 2024-06-07 SDOH — ECONOMIC STABILITY: HOUSING INSECURITY
IN THE LAST 12 MONTHS, WAS THERE A TIME WHEN YOU DID NOT HAVE A STEADY PLACE TO SLEEP OR SLEPT IN A SHELTER (INCLUDING NOW)?: YES

## 2024-06-07 SDOH — ECONOMIC STABILITY: HOUSING INSECURITY: IN THE LAST 12 MONTHS, HOW MANY PLACES HAVE YOU LIVED?: 1

## 2024-06-07 SDOH — HEALTH STABILITY: PHYSICAL HEALTH: ON AVERAGE, HOW MANY MINUTES DO YOU ENGAGE IN EXERCISE AT THIS LEVEL?: 0 MIN

## 2024-06-07 SDOH — ECONOMIC STABILITY: TRANSPORTATION INSECURITY
IN THE PAST 12 MONTHS, HAS LACK OF RELIABLE TRANSPORTATION KEPT YOU FROM MEDICAL APPOINTMENTS, MEETINGS, WORK OR FROM GETTING THINGS NEEDED FOR DAILY LIVING?: NO

## 2024-06-07 SDOH — ECONOMIC STABILITY: TRANSPORTATION INSECURITY
IN THE PAST 12 MONTHS, HAS LACK OF TRANSPORTATION KEPT YOU FROM MEETINGS, WORK, OR FROM GETTING THINGS NEEDED FOR DAILY LIVING?: NO

## 2024-06-07 SDOH — HEALTH STABILITY: MENTAL HEALTH
STRESS IS WHEN SOMEONE FEELS TENSE, NERVOUS, ANXIOUS, OR CAN'T SLEEP AT NIGHT BECAUSE THEIR MIND IS TROUBLED. HOW STRESSED ARE YOU?: RATHER MUCH

## 2024-06-07 ASSESSMENT — LIFESTYLE VARIABLES
SKIP TO QUESTIONS 9-10: 1
HOW OFTEN DO YOU HAVE A DRINK CONTAINING ALCOHOL: NEVER
HOW OFTEN DO YOU HAVE SIX OR MORE DRINKS ON ONE OCCASION: NEVER
HOW MANY STANDARD DRINKS CONTAINING ALCOHOL DO YOU HAVE ON A TYPICAL DAY: PATIENT DOES NOT DRINK
AUDIT-C TOTAL SCORE: 0

## 2024-06-07 ASSESSMENT — SOCIAL DETERMINANTS OF HEALTH (SDOH)
HOW OFTEN DO YOU ATTEND CHURCH OR RELIGIOUS SERVICES?: NEVER
IN A TYPICAL WEEK, HOW MANY TIMES DO YOU TALK ON THE PHONE WITH FAMILY, FRIENDS, OR NEIGHBORS?: NEVER
HOW OFTEN DO YOU GET TOGETHER WITH FRIENDS OR RELATIVES?: NEVER
HOW OFTEN DO YOU GET TOGETHER WITH FRIENDS OR RELATIVES?: NEVER
HOW HARD IS IT FOR YOU TO PAY FOR THE VERY BASICS LIKE FOOD, HOUSING, MEDICAL CARE, AND HEATING?: VERY HARD
IN A TYPICAL WEEK, HOW MANY TIMES DO YOU TALK ON THE PHONE WITH FAMILY, FRIENDS, OR NEIGHBORS?: NEVER
HOW MANY DRINKS CONTAINING ALCOHOL DO YOU HAVE ON A TYPICAL DAY WHEN YOU ARE DRINKING: PATIENT DOES NOT DRINK
DO YOU BELONG TO ANY CLUBS OR ORGANIZATIONS SUCH AS CHURCH GROUPS UNIONS, FRATERNAL OR ATHLETIC GROUPS, OR SCHOOL GROUPS?: NO
WITHIN THE PAST 12 MONTHS, YOU WORRIED THAT YOUR FOOD WOULD RUN OUT BEFORE YOU GOT THE MONEY TO BUY MORE: SOMETIMES TRUE
DO YOU BELONG TO ANY CLUBS OR ORGANIZATIONS SUCH AS CHURCH GROUPS UNIONS, FRATERNAL OR ATHLETIC GROUPS, OR SCHOOL GROUPS?: NO
HOW OFTEN DO YOU ATTEND CHURCH OR RELIGIOUS SERVICES?: NEVER
HOW OFTEN DO YOU HAVE A DRINK CONTAINING ALCOHOL: NEVER
HOW OFTEN DO YOU ATTENT MEETINGS OF THE CLUB OR ORGANIZATION YOU BELONG TO?: NEVER
HOW OFTEN DO YOU ATTENT MEETINGS OF THE CLUB OR ORGANIZATION YOU BELONG TO?: NEVER
HOW OFTEN DO YOU HAVE SIX OR MORE DRINKS ON ONE OCCASION: NEVER

## 2024-06-10 ENCOUNTER — APPOINTMENT (OUTPATIENT)
Dept: MEDICAL GROUP | Age: 39
End: 2024-06-10

## 2024-08-11 ENCOUNTER — HOSPITAL ENCOUNTER (EMERGENCY)
Facility: MEDICAL CENTER | Age: 39
End: 2024-08-11
Attending: EMERGENCY MEDICINE
Payer: MEDICAID

## 2024-08-11 ENCOUNTER — APPOINTMENT (OUTPATIENT)
Dept: RADIOLOGY | Facility: MEDICAL CENTER | Age: 39
End: 2024-08-11
Attending: EMERGENCY MEDICINE
Payer: MEDICAID

## 2024-08-11 ENCOUNTER — HOSPITAL ENCOUNTER (EMERGENCY)
Facility: MEDICAL CENTER | Age: 39
End: 2024-08-11
Payer: MEDICAID

## 2024-08-11 VITALS
OXYGEN SATURATION: 96 % | DIASTOLIC BLOOD PRESSURE: 83 MMHG | BODY MASS INDEX: 29.76 KG/M2 | WEIGHT: 207.89 LBS | HEIGHT: 70 IN | HEART RATE: 75 BPM | RESPIRATION RATE: 16 BRPM | TEMPERATURE: 98 F | SYSTOLIC BLOOD PRESSURE: 138 MMHG

## 2024-08-11 DIAGNOSIS — S90.121A CONTUSION OF LESSER TOE OF RIGHT FOOT WITHOUT DAMAGE TO NAIL, INITIAL ENCOUNTER: ICD-10-CM

## 2024-08-11 PROCEDURE — 99283 EMERGENCY DEPT VISIT LOW MDM: CPT

## 2024-08-11 PROCEDURE — 73660 X-RAY EXAM OF TOE(S): CPT | Mod: RT

## 2024-08-11 SDOH — HEALTH STABILITY: PHYSICAL HEALTH: ON AVERAGE, HOW MANY MINUTES DO YOU ENGAGE IN EXERCISE AT THIS LEVEL?: 60 MIN

## 2024-08-11 SDOH — ECONOMIC STABILITY: FOOD INSECURITY: WITHIN THE PAST 12 MONTHS, THE FOOD YOU BOUGHT JUST DIDN'T LAST AND YOU DIDN'T HAVE MONEY TO GET MORE.: NEVER TRUE

## 2024-08-11 SDOH — HEALTH STABILITY: PHYSICAL HEALTH: ON AVERAGE, HOW MANY DAYS PER WEEK DO YOU ENGAGE IN MODERATE TO STRENUOUS EXERCISE (LIKE A BRISK WALK)?: 4 DAYS

## 2024-08-11 SDOH — ECONOMIC STABILITY: INCOME INSECURITY: HOW HARD IS IT FOR YOU TO PAY FOR THE VERY BASICS LIKE FOOD, HOUSING, MEDICAL CARE, AND HEATING?: SOMEWHAT HARD

## 2024-08-11 SDOH — ECONOMIC STABILITY: HOUSING INSECURITY

## 2024-08-11 SDOH — ECONOMIC STABILITY: HOUSING INSECURITY
IN THE LAST 12 MONTHS, WAS THERE A TIME WHEN YOU DID NOT HAVE A STEADY PLACE TO SLEEP OR SLEPT IN A SHELTER (INCLUDING NOW)?: PATIENT DECLINED

## 2024-08-11 SDOH — ECONOMIC STABILITY: INCOME INSECURITY: IN THE LAST 12 MONTHS, WAS THERE A TIME WHEN YOU WERE NOT ABLE TO PAY THE MORTGAGE OR RENT ON TIME?: PATIENT DECLINED

## 2024-08-11 SDOH — ECONOMIC STABILITY: FOOD INSECURITY: WITHIN THE PAST 12 MONTHS, YOU WORRIED THAT YOUR FOOD WOULD RUN OUT BEFORE YOU GOT MONEY TO BUY MORE.: SOMETIMES TRUE

## 2024-08-11 ASSESSMENT — SOCIAL DETERMINANTS OF HEALTH (SDOH)
HOW OFTEN DO YOU GET TOGETHER WITH FRIENDS OR RELATIVES?: NEVER
HOW OFTEN DO YOU HAVE SIX OR MORE DRINKS ON ONE OCCASION: NEVER
IN A TYPICAL WEEK, HOW MANY TIMES DO YOU TALK ON THE PHONE WITH FAMILY, FRIENDS, OR NEIGHBORS?: NEVER
IN A TYPICAL WEEK, HOW MANY TIMES DO YOU TALK ON THE PHONE WITH FAMILY, FRIENDS, OR NEIGHBORS?: NEVER
HOW OFTEN DO YOU ATTENT MEETINGS OF THE CLUB OR ORGANIZATION YOU BELONG TO?: NEVER
DO YOU BELONG TO ANY CLUBS OR ORGANIZATIONS SUCH AS CHURCH GROUPS UNIONS, FRATERNAL OR ATHLETIC GROUPS, OR SCHOOL GROUPS?: NO
DO YOU BELONG TO ANY CLUBS OR ORGANIZATIONS SUCH AS CHURCH GROUPS UNIONS, FRATERNAL OR ATHLETIC GROUPS, OR SCHOOL GROUPS?: NO
HOW HARD IS IT FOR YOU TO PAY FOR THE VERY BASICS LIKE FOOD, HOUSING, MEDICAL CARE, AND HEATING?: SOMEWHAT HARD
HOW OFTEN DO YOU ATTEND CHURCH OR RELIGIOUS SERVICES?: NEVER
HOW OFTEN DO YOU GET TOGETHER WITH FRIENDS OR RELATIVES?: NEVER
IN THE PAST 12 MONTHS, HAS THE ELECTRIC, GAS, OIL, OR WATER COMPANY THREATENED TO SHUT OFF SERVICE IN YOUR HOME?: PATIENT DECLINED
HOW OFTEN DO YOU HAVE A DRINK CONTAINING ALCOHOL: NEVER
HOW OFTEN DO YOU ATTEND CHURCH OR RELIGIOUS SERVICES?: NEVER
HOW MANY DRINKS CONTAINING ALCOHOL DO YOU HAVE ON A TYPICAL DAY WHEN YOU ARE DRINKING: PATIENT DOES NOT DRINK
WITHIN THE PAST 12 MONTHS, YOU WORRIED THAT YOUR FOOD WOULD RUN OUT BEFORE YOU GOT THE MONEY TO BUY MORE: SOMETIMES TRUE
HOW OFTEN DO YOU ATTENT MEETINGS OF THE CLUB OR ORGANIZATION YOU BELONG TO?: NEVER

## 2024-08-11 ASSESSMENT — LIFESTYLE VARIABLES
HOW OFTEN DO YOU HAVE SIX OR MORE DRINKS ON ONE OCCASION: NEVER
AUDIT-C TOTAL SCORE: 0
SKIP TO QUESTIONS 9-10: 1
HOW OFTEN DO YOU HAVE A DRINK CONTAINING ALCOHOL: NEVER
HOW MANY STANDARD DRINKS CONTAINING ALCOHOL DO YOU HAVE ON A TYPICAL DAY: PATIENT DOES NOT DRINK

## 2024-08-11 ASSESSMENT — PAIN DESCRIPTION - PAIN TYPE: TYPE: ACUTE PAIN

## 2024-08-11 NOTE — ED TRIAGE NOTES
Hoda Cathieolga Hope  39 y.o. female  Chief Complaint   Patient presents with    Digit Pain     Pt states she believes she may have broken her middle toe on the right foot. Pt states she got her foot stuck in her couch and upon pulling her foot out, she has felt pain since.        Vitals:    08/11/24 1541   BP: 121/68   Pulse: 72   Resp: 18   Temp: 36.7 °C (98 °F)   SpO2: 95%       Patient educated on triage process and encouraged to alert staff of any changes in condition.

## 2024-08-11 NOTE — ED NOTES
Pt ambulated back to ortho 01 with a steady gait without incident. Primary assessment complete. Chart up for ERP.

## 2024-08-11 NOTE — ED PROVIDER NOTES
ER Provider Note    Scribed for Sam Coker M.D. by Darius Santana. 8/11/2024   4:45 PM    Primary Care Provider: Tiffany Reid P.A.-C.    CHIEF COMPLAINT  Chief Complaint   Patient presents with    Digit Pain     Pt states she believes she may have broken her middle toe on the right foot. Pt states she got her foot stuck in her couch and upon pulling her foot out, she has felt pain since.      EXTERNAL RECORDS REVIEWED  Outpatient Notes Patient is followed at the [facility] for carpal tunnel syndrome. No pertinent records available.     HPI/ROS  LIMITATION TO HISTORY   Select: : None  OUTSIDE HISTORIAN(S):  Family is present at bedside today.     Hoda Hope is a 39 y.o. female who presents to the ED for evaluation of right middle toe pain onset last night. Patient reports that she kicked her toe on a piece of wood that is inside her couch, but states that she was half asleep when this happened, and she is unable to fully recall the event. She has had right toe pain and redness since the event, but denies any pain extending into her foot or ankle. She reports having difficulty putting her shoes on. Patient denies any other complaints at this time.     PAST MEDICAL HISTORY  Past Medical History:   Diagnosis Date    Allergy     amoxacillin, iodine    Antibiotic-associated diarrhea 9/5/2018    Anxiety     Depression     Migraine        SURGICAL HISTORY  Past Surgical History:   Procedure Laterality Date    MI NEUROPLASTY & OR TRANSPOS MEDIAN NRV CARPAL ADDIS Right 1/27/2023    Procedure: RIGHT OPEN CARPAL TUNNEL RELEASE;  Surgeon: Regina Harding M.D.;  Location: [facility];  Service: Orthopedics    TENOLYSIS TENDON OF THE HAND Left 2009    thumb tendon release    HAND SURGERY Left 2006    cyst removal       FAMILY HISTORY  Family History   Problem Relation Age of Onset    Kidney Disease Mother     Psychiatric Illness Mother     Heart Disease Father     Kidney Disease  "Father     Diabetes Father     Hypertension Father     Other Father         motor neuropath    Heart Disease Sister     Depression Sister     GI Disease Sister     No Known Problems Brother     No Known Problems Child     Dementia Maternal Grandmother     Breast Cancer Paternal Grandmother     Cancer Paternal Grandmother     Lung Cancer Paternal Grandfather         possible asbestos    Diabetes Paternal Grandfather        SOCIAL HISTORY   reports that she has been smoking cigarettes. She started smoking about 29 years ago. She has a 14.7 pack-year smoking history. She has never used smokeless tobacco. She reports current drug use. Frequency: 21.00 times per week. Drugs: Inhaled and Marijuana. She reports that she does not drink alcohol.    CURRENT MEDICATIONS  Discharge Medication List as of 8/11/2024  5:11 PM        CONTINUE these medications which have NOT CHANGED    Details   gabapentin (NEURONTIN) 100 MG Cap Take 1 Capsule by mouth 3 times a day., Disp-90 Capsule, R-3, Normal      cetirizine (ZYRTEC) 10 MG Tab Take 1 Tablet by mouth every day., Disp-30 Tablet, R-2, Normal      fluticasone (FLONASE) 50 MCG/ACT nasal spray Administer 1 Spray into affected nostril(S) every day., Disp-16 g, R-1, Normal      naproxen (NAPROSYN) 500 MG Tab Take 1 Tablet by mouth 2 times a day with meals., Disp-60 Tablet, R-1, Normal             ALLERGIES  Allergies   Allergen Reactions    Amoxicillin Hives    Iodine Hives        PHYSICAL EXAM  /83   Pulse 75   Temp 36.7 °C (98 °F) (Temporal)   Resp 16   Ht 1.778 m (5' 10\")   Wt 94.3 kg (207 lb 14.3 oz)   SpO2 96%   BMI 29.83 kg/m²    Nursing note and vitals reviewed.  Constitutional: Well-developed and well-nourished. No distress.   HENT: Head is normocephalic and atraumatic. Oropharynx is clear and moist without exudate or erythema.   Eyes: Pupils are equal, round, and reactive to light. Conjunctiva are normal.   Cardiovascular: Normal rate and regular rhythm. No murmur " heard. Normal radial pulses.  Pulmonary/Chest: Breath sounds normal. No wheezes or rales.   Abdominal: Soft and non-tender. No distention    Musculoskeletal: Diffuse tenderness and swelling of the right third toe. Extremities exhibit normal range of motion.  Neurological: Awake, alert and oriented to person, place, and time. No focal deficits noted.  Skin: Skin is warm and dry. No rash.   Psychiatric: Normal mood and affect. Appropriate for clinical situation    DIAGNOSTIC STUDIES    Radiology:   This attending emergency physician has independently interpreted the diagnostic imaging associated with this visit and is awaiting the final reading from the radiologist.   Preliminary interpretation is a follows: Negative for any fractures.     Radiologist interpretation:   DX-TOE(S) 2+ RIGHT   Final Result         1.  No radiographic evidence of acute injury.           INITIAL ASSESSMENT AND PLAN    4:45 PM - Patient was evaluated at bedside. Patient presents today with pain and swelling to the right third toe after hitting it on a piece of wood last night. Ordered for DX-Toe 2+ right to evaluate, which came back negative for any fractures. I informed the patient of this finding and I discussed plan for discharge and follow up as outlined below. The patient is stable for discharge at this time and will return for any new or worsening symptoms. Patient verbalizes understanding and support with my plan for discharge.      DISPOSITION AND DISCUSSIONS    I have discussed management of the patient with the following physicians and ANJUM's:  None.     Discussion of management with other [facility] or appropriate source(s): None     Barriers to care at this time, including but not limited to:  There are none known at this time.  .     Decision tools and prescription drugs considered including, but not limited to: Pain Medications   . I considered prescribing narcotic pain medication, however I feel pain should be adequately controlled  with over the counter NSAIDs.       The patient will return for new or worsening symptoms and is stable at the time of discharge.    DISPOSITION:  Patient will be discharged home in stable condition.    FOLLOW UP:  Tiffany Reid P.A.-C.  [address] [address]  [address]  [facility] [id number] [phone]    Schedule an appointment as soon as possible for a visit       [facility], Emergency Dept  [address] [facility] [address] [phone]    If symptoms worsen      OUTPATIENT MEDICATIONS:  Discharge Medication List as of 8/11/2024  5:11 PM           FINAL DIAGNOSIS  1. Contusion of lesser toe of right foot without damage to nail, initial encounter         Darius ORTIZ (Scribe), am scribing for, and in the presence of, Sam Coker M.D..    Electronically signed by: Darius Santana (Scribe), 8/11/2024    ISam M.D. personally performed the services described in this documentation, as scribed by Darius Santana in my presence, and it is both accurate and complete.      The note accurately reflects work and decisions made by me.  Sam Coker M.D.  8/11/2024

## 2024-08-12 NOTE — ED NOTES
Pt provided discharge instructions and educated by ERP. Pt verbalized understanding of instructions with no questions at this time. Pt ambulated back out to the ED lobby without assistance.

## 2024-08-14 ENCOUNTER — OFFICE VISIT (OUTPATIENT)
Dept: MEDICAL GROUP | Facility: MEDICAL CENTER | Age: 39
End: 2024-08-14
Attending: FAMILY MEDICINE
Payer: MEDICAID

## 2024-08-14 ENCOUNTER — HOSPITAL ENCOUNTER (OUTPATIENT)
Dept: LAB | Facility: MEDICAL CENTER | Age: 39
End: 2024-08-14
Attending: FAMILY MEDICINE
Payer: MEDICAID

## 2024-08-14 VITALS
HEART RATE: 56 BPM | TEMPERATURE: 98.5 F | HEIGHT: 70 IN | WEIGHT: 207 LBS | DIASTOLIC BLOOD PRESSURE: 68 MMHG | BODY MASS INDEX: 29.63 KG/M2 | RESPIRATION RATE: 16 BRPM | OXYGEN SATURATION: 98 % | SYSTOLIC BLOOD PRESSURE: 110 MMHG

## 2024-08-14 DIAGNOSIS — G62.9 NEUROPATHY: ICD-10-CM

## 2024-08-14 DIAGNOSIS — Z11.59 NEED FOR HEPATITIS C SCREENING TEST: ICD-10-CM

## 2024-08-14 DIAGNOSIS — Z11.4 SCREENING FOR HIV WITHOUT PRESENCE OF RISK FACTORS: ICD-10-CM

## 2024-08-14 DIAGNOSIS — F32.A ANXIETY AND DEPRESSION: ICD-10-CM

## 2024-08-14 DIAGNOSIS — Z13.6 SCREENING FOR CARDIOVASCULAR CONDITION: ICD-10-CM

## 2024-08-14 DIAGNOSIS — M27.9 DISORDER OF RIGHT SIDE OF JAW: ICD-10-CM

## 2024-08-14 DIAGNOSIS — Z76.89 ENCOUNTER TO ESTABLISH CARE WITH NEW DOCTOR: ICD-10-CM

## 2024-08-14 DIAGNOSIS — F41.9 ANXIETY AND DEPRESSION: ICD-10-CM

## 2024-08-14 LAB
ALBUMIN SERPL BCP-MCNC: 4.2 G/DL (ref 3.2–4.9)
ALBUMIN/GLOB SERPL: 1.8 G/DL
ALP SERPL-CCNC: 88 U/L (ref 30–99)
ALT SERPL-CCNC: 12 U/L (ref 2–50)
ANION GAP SERPL CALC-SCNC: 12 MMOL/L (ref 7–16)
AST SERPL-CCNC: 18 U/L (ref 12–45)
BASOPHILS # BLD AUTO: 0.4 % (ref 0–1.8)
BASOPHILS # BLD: 0.04 K/UL (ref 0–0.12)
BILIRUB SERPL-MCNC: 0.4 MG/DL (ref 0.1–1.5)
BUN SERPL-MCNC: 7 MG/DL (ref 8–22)
CALCIUM ALBUM COR SERPL-MCNC: 8.7 MG/DL (ref 8.5–10.5)
CALCIUM SERPL-MCNC: 8.9 MG/DL (ref 8.5–10.5)
CHLORIDE SERPL-SCNC: 106 MMOL/L (ref 96–112)
CHOLEST SERPL-MCNC: 149 MG/DL (ref 100–199)
CO2 SERPL-SCNC: 25 MMOL/L (ref 20–33)
CREAT SERPL-MCNC: 0.62 MG/DL (ref 0.5–1.4)
EOSINOPHIL # BLD AUTO: 0.09 K/UL (ref 0–0.51)
EOSINOPHIL NFR BLD: 0.9 % (ref 0–6.9)
ERYTHROCYTE [DISTWIDTH] IN BLOOD BY AUTOMATED COUNT: 46.3 FL (ref 35.9–50)
EST. AVERAGE GLUCOSE BLD GHB EST-MCNC: 91 MG/DL
GFR SERPLBLD CREATININE-BSD FMLA CKD-EPI: 116 ML/MIN/1.73 M 2
GLOBULIN SER CALC-MCNC: 2.3 G/DL (ref 1.9–3.5)
GLUCOSE SERPL-MCNC: 81 MG/DL (ref 65–99)
HBA1C MFR BLD: 4.8 % (ref 4–5.6)
HCT VFR BLD AUTO: 48.7 % (ref 37–47)
HCV AB SER QL: NORMAL
HDLC SERPL-MCNC: 34 MG/DL
HGB BLD-MCNC: 15.8 G/DL (ref 12–16)
HIV 1+2 AB+HIV1 P24 AG SERPL QL IA: NORMAL
IMM GRANULOCYTES # BLD AUTO: 0.05 K/UL (ref 0–0.11)
IMM GRANULOCYTES NFR BLD AUTO: 0.5 % (ref 0–0.9)
LDLC SERPL CALC-MCNC: 96 MG/DL
LYMPHOCYTES # BLD AUTO: 2.54 K/UL (ref 1–4.8)
LYMPHOCYTES NFR BLD: 25.1 % (ref 22–41)
MCH RBC QN AUTO: 29.6 PG (ref 27–33)
MCHC RBC AUTO-ENTMCNC: 32.4 G/DL (ref 32.2–35.5)
MCV RBC AUTO: 91.4 FL (ref 81.4–97.8)
MONOCYTES # BLD AUTO: 0.63 K/UL (ref 0–0.85)
MONOCYTES NFR BLD AUTO: 6.2 % (ref 0–13.4)
NEUTROPHILS # BLD AUTO: 6.76 K/UL (ref 1.82–7.42)
NEUTROPHILS NFR BLD: 66.9 % (ref 44–72)
NRBC # BLD AUTO: 0 K/UL
NRBC BLD-RTO: 0 /100 WBC (ref 0–0.2)
PLATELET # BLD AUTO: 228 K/UL (ref 164–446)
PMV BLD AUTO: 11.2 FL (ref 9–12.9)
POTASSIUM SERPL-SCNC: 4 MMOL/L (ref 3.6–5.5)
PROT SERPL-MCNC: 6.5 G/DL (ref 6–8.2)
RBC # BLD AUTO: 5.33 M/UL (ref 4.2–5.4)
SODIUM SERPL-SCNC: 143 MMOL/L (ref 135–145)
TRIGL SERPL-MCNC: 93 MG/DL (ref 0–149)
TSH SERPL DL<=0.005 MIU/L-ACNC: 1.52 UIU/ML (ref 0.38–5.33)
VIT B12 SERPL-MCNC: 212 PG/ML (ref 211–911)
WBC # BLD AUTO: 10.1 K/UL (ref 4.8–10.8)

## 2024-08-14 PROCEDURE — 80053 COMPREHEN METABOLIC PANEL: CPT

## 2024-08-14 PROCEDURE — 86803 HEPATITIS C AB TEST: CPT

## 2024-08-14 PROCEDURE — 85025 COMPLETE CBC W/AUTO DIFF WBC: CPT

## 2024-08-14 PROCEDURE — 80061 LIPID PANEL: CPT

## 2024-08-14 PROCEDURE — 82607 VITAMIN B-12: CPT

## 2024-08-14 PROCEDURE — 99213 OFFICE O/P EST LOW 20 MIN: CPT | Performed by: FAMILY MEDICINE

## 2024-08-14 PROCEDURE — 99214 OFFICE O/P EST MOD 30 MIN: CPT | Performed by: FAMILY MEDICINE

## 2024-08-14 PROCEDURE — 84443 ASSAY THYROID STIM HORMONE: CPT

## 2024-08-14 PROCEDURE — 36415 COLL VENOUS BLD VENIPUNCTURE: CPT

## 2024-08-14 PROCEDURE — 83036 HEMOGLOBIN GLYCOSYLATED A1C: CPT

## 2024-08-14 PROCEDURE — 87389 HIV-1 AG W/HIV-1&-2 AB AG IA: CPT

## 2024-08-14 RX ORDER — GABAPENTIN 100 MG/1
100 CAPSULE ORAL 3 TIMES DAILY
Qty: 90 CAPSULE | Refills: 3 | Status: SHIPPED | OUTPATIENT
Start: 2024-08-14

## 2024-08-14 ASSESSMENT — PATIENT HEALTH QUESTIONNAIRE - PHQ9
5. POOR APPETITE OR OVEREATING: 3 - NEARLY EVERY DAY
CLINICAL INTERPRETATION OF PHQ2 SCORE: 4
SUM OF ALL RESPONSES TO PHQ QUESTIONS 1-9: 18

## 2024-08-14 ASSESSMENT — ANXIETY QUESTIONNAIRES
1. FEELING NERVOUS, ANXIOUS, OR ON EDGE: NEARLY EVERY DAY
6. BECOMING EASILY ANNOYED OR IRRITABLE: NEARLY EVERY DAY
7. FEELING AFRAID AS IF SOMETHING AWFUL MIGHT HAPPEN: NEARLY EVERY DAY
2. NOT BEING ABLE TO STOP OR CONTROL WORRYING: NEARLY EVERY DAY
GAD7 TOTAL SCORE: 20
3. WORRYING TOO MUCH ABOUT DIFFERENT THINGS: NEARLY EVERY DAY
5. BEING SO RESTLESS THAT IT IS HARD TO SIT STILL: NEARLY EVERY DAY
4. TROUBLE RELAXING: MORE THAN HALF THE DAYS

## 2024-08-14 NOTE — PROGRESS NOTES
Verbal consent was acquired by the patient to use [employer] ambient listening note generation during this visit     Subjective:     CC:    Chief Complaint   Patient presents with    Establish Care     Neurology referral due to overactive nerves, pt was told by the surgeon who operated on her rt hand    Ear Pain     Pain behind rt ear radiating into neck    Anxiety    Depression       HISTORY OF THE PRESENT ILLNESS: Hoda Hope is a 39 y.o. female. This pleasant patient is here today to establish primary care with me and discuss the following issues.   Her prior PCP was Tiffany Reid ; last seen 2022    Specialists   None current    History of Present Illness  The patient presents to establish [facility]. She is accompanied by her , Ian and son Jesus.    She requires a refill of her gabapentin prescription, which she was prescribed by her hand surgeon that was treating her for carpal tunnel syndrome. Gabapentin 100 mg, taken three times a day, was effective in managing her symptoms, but she has been off it for about six months. The doctor suggested a neurology consultation due to her overactive nerves. She experiences heightened anxiety due to her nerve issues, which sometimes leads to fear of her  without any specific reason. She denies any IPV. She also reports a lifelong experience of neuropathy, describing it as feeling like nails on her skin or being bitten all over her body by ants. She has sensitivity to hot and cold temperatures. She reports her carpal tunnel surgery in 2023 lasted longer than expected due to her overactive nerves. She has not returned to work since her surgery.    She reports a history of PTSD, anxiety, and depression for a long time. She was referred to behavioral health but did not find counseling helpful. She occasionally has thoughts of self-harm but does not have any intent or plan. She has scars on her arms from past self-harm attempts. She believes her anxiety  affects her eating habits, leading to weight gain. She denies having bipolar disorder and requests its removal from her chart. She has had five pregnancies and five vaginal births. She has a Mirena IUD placed two years ago.    She has been experiencing discomfort in her right ear for several months, which causes pain in her entire jaw. She can feel something moving inside her ear when she pushes up on it. She has been massaging her eustachian tube, which sometimes results in a sensation of liquid running down the inside of her ear canal. This has been ongoing for over a year. The symptoms are intermittent and become noticeable when she has difficulty moving her jaw and experiences ear pain. The severity varies from day to day. She had an MRI of her head in her youth due to migraines, which revealed an infection that was never treated.    SOCIAL HISTORY  She is a current smoker and smokes about a pack a day since age 9. She did quit for a few years when she was pregnant with her son. She has never had issues with alcohol. She does not drink at all. She denies any illicit drug use.    FAMILY HISTORY  Her father had diabetes and motor neuropathy. Her mother has nerve disorder. Her half-brother  at 33 or 34, but he was diagnosed with juvenile diabetes.    ALLERGIES  She is allergic to AMOXICILLIN and IODINE. She gets hives from SHELLFISH.    No problem-specific Assessment & Plan notes found for this encounter.      Allergies: Amoxicillin and Iodine      [facility] [address] [facility] [place] [address]  [address] [address]  Phone: [phone] Fax: [phone]    [id number] [place]  [address] [place] [address] [address]  [address]  [facility] [place] [address]  Phone: [phone] Fax: [phone]      Current Outpatient Medications   Medication Sig Dispense Refill    gabapentin (NEURONTIN) 100 MG Cap Take 1 Capsule by mouth 3 times a  day. 90 Capsule 3    Levonorgestrel (MIRENA, 52 MG, IU) by Intrauterine route. Placed 2022       No current facility-administered medications for this visit.       Past Medical History:   Diagnosis Date    Allergy     amoxacillin, iodine    Antibiotic-associated diarrhea 9/5/2018    Anxiety     Depression     Migraine        Past Surgical History:   Procedure Laterality Date    ND NEUROPLASTY & OR TRANSPOS MEDIAN NRV CARPAL ADDIS Right 1/27/2023    Procedure: RIGHT OPEN CARPAL TUNNEL RELEASE;  Surgeon: Regina Harding M.D.;  Location: [facility];  Service: Orthopedics    TENOLYSIS TENDON OF THE HAND Left 2009    thumb tendon release    HAND SURGERY Left 2006    cyst removal       Family History   Problem Relation Age of Onset    Kidney Disease Mother     Psychiatric Illness Mother     Alcohol abuse Mother     Heart Disease Father     Kidney Disease Father     Diabetes Father     Hypertension Father     Other Father         motor neuropath    Alcohol abuse Father     Heart Disease Sister     Depression Sister     GI Disease Sister     No Known Problems Brother     Dementia Maternal Grandmother     Breast Cancer Paternal Grandmother     Cancer Paternal Grandmother     Lung Cancer Paternal Grandfather         possible asbestos    Diabetes Paternal Grandfather     No Known Problems Son        Social History     Tobacco Use    Smoking status: Every Day     Current packs/day: 0.50     Average packs/day: 0.6 packs/day for 39.7 years (25.0 ttl pk-yrs)     Types: Cigarettes     Start date: 3/10/1995    Smokeless tobacco: Never    Tobacco comments:     1 pack a day since age 9   Vaping Use    Vaping status: Never Used   Substance Use Topics    Alcohol use: No     Comment: Denies problematic drinking; rare etoh use    Drug use: Yes     Frequency: 21.0 times per week     Types: Marijuana     Comment: 1.75g/week; last used 24 hours ago; denies illicit or IVDU         Objective:     /68   Pulse  "(!) 56   Temp 36.9 °C (98.5 °F)   Resp 16   Ht 1.778 m (5' 10\")   Wt 93.9 kg (207 lb)   SpO2 98%   BMI 29.70 kg/m²   Physical Exam  Constitutional: Alert, no distress  Skin: No rashes in visible areas  Eye: Conjunctiva clear, lids normal  Jaw: no difficulty with opening mouth fully; audible clicking and popping sound during joint movement. Mild TTP of TMJ area.  Respiratory: Unlabored respiratory effort on room air, no cough, lungs clear to auscultation bilaterally  CV: RRR  MSK: Normal gait, moves all extremities  Psych: Alert and oriented x3, normal affect and mood      Assessment & Plan:   39 y.o. female with the following -    1. Encounter to establish care with new doctor        2. Neuropathy  gabapentin (NEURONTIN) 100 MG Cap    HEMOGLOBIN A1C    Lipid Profile    Comp Metabolic Panel    CBC WITH DIFFERENTIAL    VITAMIN B12    TSH WITH REFLEX TO FT4    Referral to Neurology    Referral to Pain Clinic      3. Anxiety and depression  Referral to Behavioral Health      4. Disorder of right side of jaw  [id number]      5. Screening for cardiovascular condition        6. Screening for HIV without presence of risk factors  HIV AG/AB COMBO ASSAY SCREENING    HEP C VIRUS ANTIBODY      7. Need for hepatitis C screening test          Assessment & Plan  1. Neuropathic pain.  Symptoms suggest possible neuropathic pain. A referral to neurology and physiatry has been made for further evaluation and management. A comprehensive panel of blood work has been ordered to identify potential contributors to the neuropathic pain, including a diabetes screen. Gabapentin 100 mg three times a day has been refilled and sent to [facility].    2. PTSD. 3. Anxiety. 4. Depression.  Symptoms are indicative of uncontrolled and severe anxiety and depression. An urgent referral to psychiatry has been made. The [facility] mental health lifeline number has been provided for immediate access to a trained mental health counselor. Additional " resources will be sent via [employer].    5. Right ear pain.  Symptoms suggest possible eustachian tube dysfunction from a TMJ disorder. Imaging of the area has been recommended. She has been advised to discuss this with her dentist during her next visit on 08/22/2024.    6. Medication Management.  A refill for gabapentin 100 mg three times a day has been sent to [facility].    7. Health Maintenance.  A comprehensive discussion regarding lifestyle medicine was conducted with the patient, emphasizing the importance of maintaining a healthy plant based diet, engaging in regular physical activity, maintaining healthy social relationships, managing stress, ensuring adequate restful sleep, and avoiding risky substances such as alcohol, drugs, and tobacco.  A one-time screen for HIV and hepatitis C has been ordered due to the absence of these tests in her file. B12 level and thyroid screen have also been ordered.      Return pending results.    Please note that this dictation was created using voice recognition software. I have made every reasonable attempt to correct obvious errors, but I expect that there are errors of grammar and possibly content that I did not discover before finalizing the note.

## 2024-09-24 ENCOUNTER — HOSPITAL ENCOUNTER (OUTPATIENT)
Dept: RADIOLOGY | Facility: MEDICAL CENTER | Age: 39
End: 2024-09-24
Attending: FAMILY MEDICINE
Payer: MEDICAID

## 2024-09-24 PROCEDURE — 70355 PANORAMIC X-RAY OF JAWS: CPT

## 2024-11-27 ENCOUNTER — OFFICE VISIT (OUTPATIENT)
Dept: MEDICAL GROUP | Facility: MEDICAL CENTER | Age: 39
End: 2024-11-27
Attending: FAMILY MEDICINE
Payer: MEDICAID

## 2024-11-27 VITALS
TEMPERATURE: 98.6 F | RESPIRATION RATE: 16 BRPM | BODY MASS INDEX: 30.58 KG/M2 | OXYGEN SATURATION: 96 % | HEART RATE: 60 BPM | SYSTOLIC BLOOD PRESSURE: 130 MMHG | WEIGHT: 213.6 LBS | HEIGHT: 70 IN | DIASTOLIC BLOOD PRESSURE: 82 MMHG

## 2024-11-27 DIAGNOSIS — G89.29 CHRONIC RIGHT EAR PAIN: ICD-10-CM

## 2024-11-27 DIAGNOSIS — H92.01 CHRONIC RIGHT EAR PAIN: ICD-10-CM
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
## 2024-11-27 DIAGNOSIS — M79.622 PAIN IN LEFT UPPER ARM: ICD-10-CM

## 2024-11-27 DIAGNOSIS — F32.A ANXIETY AND DEPRESSION: ICD-10-CM

## 2024-11-27 DIAGNOSIS — F41.9 ANXIETY AND DEPRESSION: ICD-10-CM

## 2024-11-27 PROCEDURE — 99213 OFFICE O/P EST LOW 20 MIN: CPT | Performed by: FAMILY MEDICINE

## 2024-11-27 PROCEDURE — 3075F SYST BP GE 130 - 139MM HG: CPT | Performed by: FAMILY MEDICINE

## 2024-11-27 PROCEDURE — 99214 OFFICE O/P EST MOD 30 MIN: CPT | Performed by: FAMILY MEDICINE

## 2024-11-27 PROCEDURE — 3079F DIAST BP 80-89 MM HG: CPT | Performed by: FAMILY MEDICINE

## 2024-11-27 RX ORDER — HYDROXYZINE HYDROCHLORIDE 25 MG/1
25 TABLET, FILM COATED ORAL 3 TIMES DAILY PRN
COMMUNITY
Start: 2024-09-23

## 2024-11-27 ASSESSMENT — FIBROSIS 4 INDEX: FIB4 SCORE: 0.89

## 2024-11-27 NOTE — PROGRESS NOTES
Verbal consent was acquired by the patient to use [employer] ambient listening note generation during this visit.    Subjective   Chief Complaint   Patient presents with    Otalgia        HPI:   Hoda presents today with    History of Present Illness  The patient presents for evaluation of multiple medical concerns.    She reports experiencing pain in her right ear, which was previously discussed with her dentist. Despite having all her teeth removed, the pain persists. She recently experienced an increase in pain during a drive to [employer], which she attributes to pressure changes. The pain, which she describes as similar to an ear infection, radiates down her neck. This episode began last Thursday. She also reports a sensation of a lump in her throat, which worsens with talking and swallowing. Despite these symptoms, she is able to eat and drink without difficulty. She reports no changes in hearing but occasionally experiences a tunnel-like sound in her ear. She has been dealing with these symptoms for over a year. She has tried over-the-counter eardrops, but they cause pain and require her to rinse her ear. She continues to smoke, consuming between half a pack to a full pack daily.    She also reports pain in her left arm, which she attributes to a fall in the shower six months ago. During the fall, both her arms had to be repositioned, and she has experienced tenderness in her left bicep area since then. She describes a stinging and burning sensation in a specific spot on her arm. She is right-handed and reports no issues with her right arm or shoulders. She has been stretching her shoulders regularly.    She reports an improvement in her mental health. She has been seeing a neurologist, Dr. Omar Naidu, who conducted an EEG and MRI. The MRI results were clear, but there were some issues with the EEG. She is currently taking a B complex supplement and fluoxetine 20 mg, which she finds effective. She is also  "under the care of a psychiatrist, Dr. Melina Huang.      Health Maintenance Due   Topic Date Due    Pneumococcal Vaccine: 0-64 Years (1 of 2 - PCV) Never done    Chickenpox Vaccine (Varicella) (1 of 2 - 13+ 2-dose series) Never done    Hepatitis B Vaccine (Hep B) (1 of 3 - 19+ 3-dose series) Never done    Cervical Cancer Screening  03/22/2021    Influenza Vaccine (1) Never done    COVID-19 Vaccine (3 - 2024-25 season) 09/01/2024       Objective   Social History     Tobacco Use    Smoking status: Every Day     Current packs/day: 0.50     Average packs/day: 0.6 packs/day for 40.0 years (25.2 ttl pk-yrs)     Types: Cigarettes     Start date: 3/10/1995    Smokeless tobacco: Never    Tobacco comments:     1 pack a day since age 9   Vaping Use    Vaping status: Never Used   Substance Use Topics    Alcohol use: No     Comment: Denies problematic drinking; rare etoh use    Drug use: Not Currently     Frequency: 21.0 times per week     Types: Marijuana     Comment: 1.75g/week; last used 24 hours ago; denies illicit or IVDU       Exam:  /82   Pulse 60   Temp 37 °C (98.6 °F)   Resp 16   Ht 1.778 m (5' 10\")   Wt 96.9 kg (213 lb 9.6 oz)   SpO2 96%   BMI 30.65 kg/m²     Physical Exam  HENT:      Head: Normocephalic and atraumatic.      Right Ear: Hearing, tympanic membrane and ear canal normal.      Left Ear: Hearing and tympanic membrane normal.   Neck:        Comments: Tenderness to palpation   Musculoskeletal:      Left shoulder: Normal.      Left upper arm: Tenderness present. No swelling, edema, deformity, lacerations or bony tenderness.        Arms:       Cervical back: Normal range of motion and neck supple.      Comments: Mild tenderness to palpation otherwise normal exam   Lymphadenopathy:      Cervical: Cervical adenopathy present.      Right cervical: Superficial cervical adenopathy present.       Constitutional: Alert, no distress  Skin: No rashes in visible areas  Eye: Conjunctiva clear, lids " normal  Respiratory: Unlabored respiratory effort, no cough  MSK: Normal gait, moves all extremities    Psych: Alert and oriented x3, normal affect and mood    Allergies   Allergen Reactions    Amoxicillin Hives    Shellfish-Derived Products      Hives       [id number] [place] - [address]  [address]  [place] [address]  Phone: [phone] Fax: [phone]    [email or web address][id number] - [place] - [address] [facility] [address]  [address]  [address]  [facility] [address]  Phone: [phone] Fax: [phone]    Current Outpatient Medications   Medication Sig Dispense Refill    FLUoxetine (PROZAC) 20 MG Cap Take 20 mg by mouth every morning.      hydrOXYzine HCl (ATARAX) 25 MG Tab Take 25 mg by mouth 3 times a day as needed for Anxiety.      gabapentin (NEURONTIN) 100 MG Cap Take 1 Capsule by mouth 3 times a day. 90 Capsule 3    Levonorgestrel (MIRENA, 52 MG, IU) by Intrauterine route. Placed 2022       No current facility-administered medications for this visit.       Assessment & Plan    39 y.o. female with the following -   1. Chronic right ear pain  Referral to ENT      2. Pain in left upper arm  DX-O-ARM    CANCELED: DX-O-ARM      3. Fall in shower  DX-O-ARM    CANCELED: DX-O-ARM      4. Anxiety and depression          Assessment & Plan  1. Right ear pain.  The patient's symptoms suggest barotrauma, a common cause of ear pain due to pressure changes. She reports that her ear pain intensified during a recent drive due to pressure changes and has been ongoing since Thursday. The pain radiates through her neck, and she feels a lump in her throat. There is no change in her hearing, but she occasionally experiences a tunnel-like sound in her ear. Her ears do not appear infected, but there is some right-sided lymphadenopathy. Smoking, a known risk factor for ear problems, could be contributing to her symptoms. She was advised to use  over-the-counter ear relief drops and to take Tylenol or ibuprofen for pain management. A referral to an ENT specialist was recommended for further evaluation.    2. Left arm pain.  The patient reports left arm pain following a fall out of the shower six months ago. The pain is localized to the bicep area, and her arm occasionally gives out. An x-ray of her left arm was ordered to further investigate the cause of her pain. Depending on the x-ray results, a referral to an orthopedic specialist may be necessary.    3. Anxiety.  She reports improvement in her mental health and is currently taking fluoxetine 20 mg and gabapentin 200 mg at night. She was advised to continue her current regimen of gabapentin, up to three times a day as needed.        Return if symptoms worsen or fail to improve, for routine annual wellness exam.    Please note that this dictation was created using voice recognition software. I have made every reasonable attempt to correct obvious errors, but I expect that there are errors of grammar and possibly content that I did not discover before finalizing the note.